# Patient Record
Sex: FEMALE | Race: WHITE | NOT HISPANIC OR LATINO | Employment: OTHER | URBAN - METROPOLITAN AREA
[De-identification: names, ages, dates, MRNs, and addresses within clinical notes are randomized per-mention and may not be internally consistent; named-entity substitution may affect disease eponyms.]

---

## 2017-01-05 ENCOUNTER — ANESTHESIA EVENT (OUTPATIENT)
Dept: PERIOP | Facility: HOSPITAL | Age: 82
End: 2017-01-05

## 2017-01-05 ENCOUNTER — ANESTHESIA (OUTPATIENT)
Dept: PERIOP | Facility: HOSPITAL | Age: 82
End: 2017-01-05

## 2017-01-05 PROCEDURE — 25010000002 DEXAMETHASONE PER 1 MG: Performed by: NURSE ANESTHETIST, CERTIFIED REGISTERED

## 2017-01-05 PROCEDURE — 25010000003 CEFAZOLIN IN DEXTROSE 2-4 GM/100ML-% SOLUTION: Performed by: ORTHOPAEDIC SURGERY

## 2017-01-05 PROCEDURE — 25010000002 PROPOFOL 10 MG/ML EMULSION: Performed by: NURSE ANESTHETIST, CERTIFIED REGISTERED

## 2017-01-05 PROCEDURE — 25010000002 ROPIVACAINE PER 1 MG: Performed by: ANESTHESIOLOGY

## 2017-01-05 PROCEDURE — 25010000002 ONDANSETRON PER 1 MG: Performed by: NURSE ANESTHETIST, CERTIFIED REGISTERED

## 2017-01-05 PROCEDURE — 25010000002 FENTANYL CITRATE (PF) 100 MCG/2ML SOLUTION: Performed by: ANESTHESIOLOGY

## 2017-01-05 PROCEDURE — 25010000002 NEOSTIGMINE PER 0.5 MG: Performed by: NURSE ANESTHETIST, CERTIFIED REGISTERED

## 2017-01-05 RX ORDER — ATRACURIUM BESYLATE 10 MG/ML
INJECTION, SOLUTION INTRAVENOUS AS NEEDED
Status: DISCONTINUED | OUTPATIENT
Start: 2017-01-05 | End: 2017-01-05 | Stop reason: SURG

## 2017-01-05 RX ORDER — PROPOFOL 10 MG/ML
VIAL (ML) INTRAVENOUS AS NEEDED
Status: DISCONTINUED | OUTPATIENT
Start: 2017-01-05 | End: 2017-01-05 | Stop reason: SURG

## 2017-01-05 RX ORDER — ONDANSETRON 2 MG/ML
INJECTION INTRAMUSCULAR; INTRAVENOUS AS NEEDED
Status: DISCONTINUED | OUTPATIENT
Start: 2017-01-05 | End: 2017-01-05 | Stop reason: SURG

## 2017-01-05 RX ORDER — BUPIVACAINE HYDROCHLORIDE 2.5 MG/ML
INJECTION, SOLUTION EPIDURAL; INFILTRATION; INTRACAUDAL AS NEEDED
Status: DISCONTINUED | OUTPATIENT
Start: 2017-01-05 | End: 2017-01-05 | Stop reason: SURG

## 2017-01-05 RX ORDER — PROPOFOL 10 MG/ML
VIAL (ML) INTRAVENOUS CONTINUOUS PRN
Status: DISCONTINUED | OUTPATIENT
Start: 2017-01-05 | End: 2017-01-05 | Stop reason: SURG

## 2017-01-05 RX ORDER — LABETALOL HYDROCHLORIDE 5 MG/ML
INJECTION, SOLUTION INTRAVENOUS AS NEEDED
Status: DISCONTINUED | OUTPATIENT
Start: 2017-01-05 | End: 2017-01-05 | Stop reason: SURG

## 2017-01-05 RX ORDER — LIDOCAINE HYDROCHLORIDE 10 MG/ML
INJECTION, SOLUTION INFILTRATION; PERINEURAL AS NEEDED
Status: DISCONTINUED | OUTPATIENT
Start: 2017-01-05 | End: 2017-01-05 | Stop reason: SURG

## 2017-01-05 RX ORDER — DEXAMETHASONE SODIUM PHOSPHATE 4 MG/ML
INJECTION, SOLUTION INTRA-ARTICULAR; INTRALESIONAL; INTRAMUSCULAR; INTRAVENOUS; SOFT TISSUE AS NEEDED
Status: DISCONTINUED | OUTPATIENT
Start: 2017-01-05 | End: 2017-01-05 | Stop reason: SURG

## 2017-01-05 RX ORDER — GLYCOPYRROLATE 0.2 MG/ML
INJECTION INTRAMUSCULAR; INTRAVENOUS AS NEEDED
Status: DISCONTINUED | OUTPATIENT
Start: 2017-01-05 | End: 2017-01-05 | Stop reason: SURG

## 2017-01-05 RX ORDER — FENTANYL CITRATE 50 UG/ML
INJECTION, SOLUTION INTRAMUSCULAR; INTRAVENOUS AS NEEDED
Status: DISCONTINUED | OUTPATIENT
Start: 2017-01-05 | End: 2017-01-05 | Stop reason: SURG

## 2017-01-05 RX ADMIN — BUPIVACAINE HYDROCHLORIDE 5 ML: 2.5 INJECTION, SOLUTION EPIDURAL; INFILTRATION; INTRACAUDAL; PERINEURAL at 15:09

## 2017-01-05 RX ADMIN — PROPOFOL 25 MCG/KG/MIN: 10 INJECTION, EMULSION INTRAVENOUS at 13:25

## 2017-01-05 RX ADMIN — ATRACURIUM BESYLATE 10 MG: 10 INJECTION, SOLUTION INTRAVENOUS at 14:29

## 2017-01-05 RX ADMIN — ROBINUL 0.4 MG: 0.2 INJECTION INTRAMUSCULAR; INTRAVENOUS at 16:22

## 2017-01-05 RX ADMIN — CEFAZOLIN SODIUM 2 G: 2 INJECTION, SOLUTION INTRAVENOUS at 13:17

## 2017-01-05 RX ADMIN — Medication 3 MG: at 16:22

## 2017-01-05 RX ADMIN — ONDANSETRON 4 MG: 2 INJECTION INTRAMUSCULAR; INTRAVENOUS at 16:11

## 2017-01-05 RX ADMIN — ATRACURIUM BESYLATE 30 MG: 10 INJECTION, SOLUTION INTRAVENOUS at 13:20

## 2017-01-05 RX ADMIN — EPHEDRINE SULFATE 10 MG: 50 INJECTION INTRAMUSCULAR; INTRAVENOUS; SUBCUTANEOUS at 14:05

## 2017-01-05 RX ADMIN — BUPIVACAINE HYDROCHLORIDE 5 ML: 2.5 INJECTION, SOLUTION EPIDURAL; INFILTRATION; INTRACAUDAL; PERINEURAL at 13:35

## 2017-01-05 RX ADMIN — LABETALOL HYDROCHLORIDE 5 MG: 5 INJECTION, SOLUTION INTRAVENOUS at 15:06

## 2017-01-05 RX ADMIN — BUPIVACAINE HYDROCHLORIDE 20 ML: 2.5 INJECTION, SOLUTION EPIDURAL; INFILTRATION; INTRACAUDAL; PERINEURAL at 12:30

## 2017-01-05 RX ADMIN — Medication 6 ML/HR: at 16:22

## 2017-01-05 RX ADMIN — LIDOCAINE HYDROCHLORIDE 50 MG: 10 INJECTION, SOLUTION INFILTRATION; PERINEURAL at 13:20

## 2017-01-05 RX ADMIN — PROPOFOL 100 MG: 10 INJECTION, EMULSION INTRAVENOUS at 13:20

## 2017-01-05 RX ADMIN — DEXAMETHASONE SODIUM PHOSPHATE 8 MG: 4 INJECTION, SOLUTION INTRAMUSCULAR; INTRAVENOUS at 13:20

## 2017-01-05 RX ADMIN — FENTANYL CITRATE 100 MCG: 50 INJECTION, SOLUTION INTRAMUSCULAR; INTRAVENOUS at 12:30

## 2017-01-05 NOTE — ANESTHESIA PREPROCEDURE EVALUATION
Anesthesia Evaluation     Patient summary reviewed and Nursing notes reviewed    Airway   Mallampati: II  TM distance: >3 FB  Neck ROM: limited  possible difficult intubation  Dental          Pulmonary - negative pulmonary ROS   Cardiovascular   Exercise tolerance: good (4-7 METS)  (+) hypertension well controlled,     Neuro/Psych- negative ROS  GI/Hepatic/Renal/Endo      Musculoskeletal     Abdominal    Substance History      OB/GYN          Other                             Anesthesia Plan    ASA 2     general     intravenous induction     ISB for post op pain

## 2017-01-05 NOTE — ANESTHESIA PROCEDURE NOTES
Peripheral Block    Patient location during procedure: pre-op  Stop time: 1/5/2017 12:41 PM  Reason for block: at surgeon's request and post-op pain management  Performed by  Anesthesiologist: IRA MARIN  Preanesthetic Checklist  Completed: patient identified, site marked, surgical consent, pre-op evaluation, timeout performed, IV checked, risks and benefits discussed and monitors and equipment checked  Peripheral Block Prep:  Sterile barriers:cap, gloves, mask and sterile barriers  Prep: ChloraPrep  Patient monitoring: blood pressure monitoring, continuous pulse oximetry and EKG  Peripheral Procedure  Sedation:yes  Guidance:ultrasound guided  Images:still images obtained  Laterality:leftBlock Type:interscalene  Injection Technique:catheterNeedle Type:Tuohy  Needle Gauge:18 G  Catheter Size:20 G (20g)ULTRASOUND INTERPRETATION. Using ultrasound guidance a gauge needle was placed in close proximity to the brachial plexus nerve, at which point, under ultrasound guidance anesthetic was injected in the area of the nerve and spread of the anesthesia was seen on ultrasound in close proximity thereto.  There were no abnormalities seen on ultrasound; a digital image was taken; and the patient tolerated the procedure with no complications.   Medications  Local Injected:bupivacaine 0.25% without epinephrine Local Amount Injected:20mL  Post Assessment  Patient Tolerance:comfortable throughout block  Complications:no  Additional Notes  Procedure:                Catheter at skin 8 cms                                      Anesthesia was provide by lido !%      The pt was placed in semifowlers position with a slight tilt of the thorax contralateral to the insertion site.  The Insertion Site was prepped and draped in sterile fashion.  The skin was anesthetized with Lidocaine 1% 1ml injection utilizing a 25g needle.  Utilizing ultrasound guidance, a BBraun 2 inch 18 g Contiplex echogenic touhy needle was advanced in-plane.   Hydro dissection of tissue was achieved with Normal saline. Major vessels(carotid and Internal Jugular) where visualized as the brachial plexus was approached at the approximate level of C-7/ T-1.  Cervical 5 and Branches of Cervical 6 nerve roots where visualized and the needle tip was placed posterior at the level of C-6 roots.  LA spread was visualized and injection was made incrementally every 5 mls with aspiration. Injection pressure was normal or little, there was no intraneural injection, no vascular injection.      The BBraun 20 g wire stylet  catheter was then placed under US guidance on the posterior aspect of the Brachial Plexus.  Location of catheter was confirmed with NS injection visualized with US . The tuohy was then removed and the skin was sealed with Skin AFix at catheter insertion site.  Skin was prepped with mastisol and the labeled catheter  was secured with steristrips and a CHG tegaderm. Thank You.

## 2017-01-05 NOTE — ANESTHESIA PROCEDURE NOTES
Airway  Urgency: elective    Airway not difficult    General Information and Staff    Patient location during procedure: OR  CRNA: JAVIER LOWERY    Indications and Patient Condition  Indications for airway management: airway protection    Preoxygenated: yes  MILS not maintained throughout  Mask difficulty assessment: 1 - vent by mask    Final Airway Details  Final airway type: endotracheal airway      Successful airway: ETT  Cuffed: yes   Successful intubation technique: direct laryngoscopy  Endotracheal tube insertion site: oral  Blade: Deejay  Blade size: #3  ETT size: 6.5 mm  Cormack-Lehane Classification: grade I - full view of glottis  Placement verified by: chest auscultation and capnometry   Cuff volume (mL): 6  Measured from: lips  ETT to lips (cm): 20  Number of attempts at approach: 1    Additional Comments  Negative epigastric sounds, Breath sound equal bilaterally with symmetric chest rise and fall. Atraumatic, no damage to lips or teeth during intubation

## 2017-05-30 ENCOUNTER — OFFICE VISIT (OUTPATIENT)
Dept: ORTHOPEDIC SURGERY | Facility: CLINIC | Age: 82
End: 2017-05-30

## 2017-05-30 VITALS
DIASTOLIC BLOOD PRESSURE: 98 MMHG | HEIGHT: 66 IN | HEART RATE: 71 BPM | SYSTOLIC BLOOD PRESSURE: 184 MMHG | BODY MASS INDEX: 26.2 KG/M2 | WEIGHT: 163 LBS

## 2017-05-30 DIAGNOSIS — M70.62 TROCHANTERIC BURSITIS OF LEFT HIP: Primary | ICD-10-CM

## 2017-05-30 DIAGNOSIS — M25.552 PAIN OF LEFT HIP JOINT: ICD-10-CM

## 2017-05-30 DIAGNOSIS — M47.26 OSTEOARTHRITIS OF SPINE WITH RADICULOPATHY, LUMBAR REGION: ICD-10-CM

## 2017-05-30 PROCEDURE — 20610 DRAIN/INJ JOINT/BURSA W/O US: CPT | Performed by: ORTHOPAEDIC SURGERY

## 2017-05-30 PROCEDURE — 99204 OFFICE O/P NEW MOD 45 MIN: CPT | Performed by: ORTHOPAEDIC SURGERY

## 2017-05-30 RX ORDER — BUPIVACAINE HYDROCHLORIDE 2.5 MG/ML
3 INJECTION, SOLUTION INFILTRATION; PERINEURAL
Status: COMPLETED | OUTPATIENT
Start: 2017-05-30 | End: 2017-05-30

## 2017-05-30 RX ORDER — LIDOCAINE HYDROCHLORIDE 10 MG/ML
3 INJECTION, SOLUTION INFILTRATION; PERINEURAL
Status: COMPLETED | OUTPATIENT
Start: 2017-05-30 | End: 2017-05-30

## 2017-05-30 RX ORDER — TRIAMCINOLONE ACETONIDE 40 MG/ML
80 INJECTION, SUSPENSION INTRA-ARTICULAR; INTRAMUSCULAR
Status: COMPLETED | OUTPATIENT
Start: 2017-05-30 | End: 2017-05-30

## 2017-05-30 RX ADMIN — BUPIVACAINE HYDROCHLORIDE 3 ML: 2.5 INJECTION, SOLUTION INFILTRATION; PERINEURAL at 09:29

## 2017-05-30 RX ADMIN — LIDOCAINE HYDROCHLORIDE 3 ML: 10 INJECTION, SOLUTION INFILTRATION; PERINEURAL at 09:29

## 2017-05-30 RX ADMIN — TRIAMCINOLONE ACETONIDE 80 MG: 40 INJECTION, SUSPENSION INTRA-ARTICULAR; INTRAMUSCULAR at 09:29

## 2017-08-01 ENCOUNTER — OFFICE VISIT (OUTPATIENT)
Dept: ORTHOPEDIC SURGERY | Facility: CLINIC | Age: 82
End: 2017-08-01

## 2017-08-01 VITALS
HEIGHT: 66 IN | BODY MASS INDEX: 24.75 KG/M2 | HEART RATE: 72 BPM | WEIGHT: 154 LBS | DIASTOLIC BLOOD PRESSURE: 90 MMHG | SYSTOLIC BLOOD PRESSURE: 175 MMHG

## 2017-08-01 DIAGNOSIS — M47.26 OSTEOARTHRITIS OF SPINE WITH RADICULOPATHY, LUMBAR REGION: Primary | ICD-10-CM

## 2017-08-01 DIAGNOSIS — M70.62 TROCHANTERIC BURSITIS OF LEFT HIP: ICD-10-CM

## 2017-08-01 PROCEDURE — 99213 OFFICE O/P EST LOW 20 MIN: CPT | Performed by: ORTHOPAEDIC SURGERY

## 2017-08-01 NOTE — PROGRESS NOTES
Orthopaedic Clinic Note: Hip Established Patient    Chief Complaint   Patient presents with   • Left Hip - Follow-up     2 months        HPI    It has been 2  month(s) since Ms. Mclain's last visit. She returns to clinic today for Follow-up of left trochanteric bursitis and radicular left lower extremity pain. She rates her pain a 8/10 on the pain scale and is currently taking Motrin/Ibuprofen and Tylenol for pain. She is ambulating with no assistive device. She has completed therapy with minimal pain relief.  Overall, she feels she is doing worse.  She did state that the corticosteroid injection into the left hip trochanteric bursa did provide significant relief for approximately 6 weeks.     Past Medical History:   Diagnosis Date   • Atrial contractions, premature    • Benign hypertension    • Chest pain    • Esophagitis    • Heart disease    • Hypercholesterolemia    • Mitral regurgitation     Mild mitral regurgitation by echocardiography.   • Osteoarthritis    • Palpitations     Palpitations.  Reviewed the results of her Zio monitor with her.  Again, they are premature atrial contractions with no arrhythmias or pauses.  Will continue with flecainide, provided refills, and see her back in 8 months.    • Pneumonia    • UTI (urinary tract infection)       Past Surgical History:   Procedure Laterality Date   • APPENDECTOMY     • BACK SURGERY      Low   • BLADDER SURGERY     • BREAST SURGERY      mammoplasty-reduction   • BUNIONECTOMY Bilateral    • CARDIAC SURGERY      cardiac stent x 1 (2001)   • CARPAL TUNNEL RELEASE Bilateral    • EYE SURGERY      retina patch bilat (1980)   • HYSTERECTOMY     • KNEE ARTHROPLASTY Right    • REPLACEMENT TOTAL KNEE Right 2015   • SHOULDER ARTHROSCOPY W/ ROTATOR CUFF REPAIR Left 1/5/2017    Procedure: SHOULDER ARTHROSCOPY WITH ROTATOR CUFF REPAIR LEFT;  Surgeon: Parrish Bright MD;  Location: [facility];  Service:       Social History     Social History   • Marital status:       Spouse name: N/A   • Number of children: N/A   • Years of education: N/A     Occupational History   • Not on file.     Social History Main Topics   • Smoking status: Never Smoker   • Smokeless tobacco: Never Used   • Alcohol use Yes      Comment: occasionally   • Drug use: No   • Sexual activity: Defer     Other Topics Concern   • Not on file     Social History Narrative      Current Outpatient Prescriptions on File Prior to Visit   Medication Sig Dispense Refill   • aspirin 81 MG EC tablet Take 81 mg by mouth Daily.     • Famotidine (PEPCID PO) Take 25 mg by mouth Daily.     • flecainide (TAMBOCOR) 50 MG tablet Take 1 tablet by mouth 2 (Two) Times a Day. 180 tablet 3   • folic acid (FOLVITE) 400 MCG tablet Take 400 mcg by mouth Daily.     • hydrochlorothiazide (HYDRODIURIL) 25 MG tablet Take 1 tablet by mouth Daily. 90 tablet 3   • HYDROcodone-acetaminophen (NORCO) 7.5-325 MG per tablet Take 1-2 tablets by mouth Every 4 (Four) Hours As Needed for moderate pain (4-6) (Pain). 51 tablet 0   • niacin 500 MG tablet Take 1 tablet by mouth Every Night. 90 tablet 3   • promethazine (PHENERGAN) 12.5 MG tablet Take 1 tablet by mouth Every 8 (Eight) Hours As Needed for nausea or vomiting. 40 tablet 0     No current facility-administered medications on file prior to visit.       Allergies   Allergen Reactions   • Albuterol      Unknown reactions   • Statins Myalgia   • Demerol [Meperidine] Itching   • Sulfa Antibiotics Itching        Review of Systems   Constitutional: Positive for activity change and fatigue.   HENT: Positive for hearing loss.    Eyes: Negative.    Respiratory: Negative.    Cardiovascular: Negative.    Gastrointestinal: Negative.    Endocrine: Negative.    Genitourinary: Positive for frequency.   Musculoskeletal: Positive for arthralgias, gait problem (Left hip pain) and neck pain.   Skin: Negative.    Allergic/Immunologic: Negative.    Hematological: Bruises/bleeds easily.   Psychiatric/Behavioral:  "Negative.         Physical Exam  Blood pressure 175/90, pulse 72, height 66\" (167.6 cm), weight 154 lb (69.9 kg).    Body mass index is 24.86 kg/(m^2).    GENERAL APPEARANCE: awake, alert, oriented, in no acute distress  LUNGS:  breathing nonlabored  EXTREMITIES: no clubbing, cyanosis  PERIPHERAL PULSES: palpable dorsalis pedis and posterior tibial pulses bilaterally.    GAIT:  Normal            Hip Exam:  Left    RANGE OF MOTION:  EXTENSION/FLEXION:  normal (0-110 degrees)  IR (at 90 degrees of flexion):  20  ER (at 90 degrees of flexion):  30  PAIN WITH HIP MOTION:  yes, Laterally over trochanteric bursa  PAIN WITH LOGROLL:  no     STINCHFIELD TEST: negative    STRENGTH:  ABDUCTOR:  4/5  ADDUCTOR:  5/5  HIP FLEXION:  5/5    GREATER TROCHANTER BURSAL PAIN:  yes    SENSATION TO LIGHT TOUCH:  DEEP PERONEAL/SUPERFICIAL PERONEAL/SURAL/SAPHENOUS/TIBIAL:   intact    EDEMA:  no  ERYTHEMA:  no  WOUNDS/INCISIONS:  no  _________________________________________________________________  _________________________________________________________________    RADIOGRAPHIC FINDINGS:   No new radiographs today    Assessment/Plan:   Diagnosis Plan   1. Osteoarthritis of spine with radiculopathy, lumbar region  Ambulatory Referral to Physical Therapy Evaluate and treat    Ambulatory Referral to Pain Management   2. Trochanteric bursitis of left hip       I explained to the patient that her ongoing radicular type pain is concerning for nerve compression the lumbar spine.  She has failed a course of physical therapy but wishes to change therapists as she did not like her therapist this past time.  I will therefore refer her to [facility] for repeat evaluation.  In addition to this I recommended referral to pain management for potential epidural injections for her lumbar radiculopathy.  I will see her back in 2 months for repeat assessment.    Huan Hansen MD  08/01/17  10:59 AM  "

## 2017-08-04 ENCOUNTER — HOSPITAL ENCOUNTER (OUTPATIENT)
Dept: PHYSICAL THERAPY | Facility: HOSPITAL | Age: 82
Setting detail: THERAPIES SERIES
Discharge: HOME | End: 2017-08-04
Attending: ORTHOPAEDIC SURGERY

## 2017-08-04 DIAGNOSIS — M54.40 CHRONIC BILATERAL LOW BACK PAIN WITH SCIATICA, SCIATICA LATERALITY UNSPECIFIED: Primary | ICD-10-CM

## 2017-08-04 DIAGNOSIS — G89.29 CHRONIC BILATERAL LOW BACK PAIN WITH SCIATICA, SCIATICA LATERALITY UNSPECIFIED: Primary | ICD-10-CM

## 2017-08-04 PROCEDURE — G8985 CARRY GOAL STATUS: HCPCS | Performed by: PHYSICAL THERAPIST

## 2017-08-04 PROCEDURE — 97162 PT EVAL MOD COMPLEX 30 MIN: CPT | Performed by: PHYSICAL THERAPIST

## 2017-08-04 PROCEDURE — G8984 CARRY CURRENT STATUS: HCPCS | Performed by: PHYSICAL THERAPIST

## 2017-08-04 PROCEDURE — 97110 THERAPEUTIC EXERCISES: CPT | Performed by: PHYSICAL THERAPIST

## 2017-08-04 NOTE — THERAPY EVALUATION
"    Outpatient Physical Therapy Ortho Initial Evaluation  [facility]     Patient Name: Yuni Mclain  : 1935  MRN: [id number]  Today's Date: 2017      Visit Date: 2017    Patient Active Problem List   Diagnosis   • Atrial contractions, premature   • Hypercholesterolemia   • Benign hypertension   • Chest pain   • Mitral regurgitation   • Palpitations        Past Medical History:   Diagnosis Date   • Atrial contractions, premature    • Benign hypertension    • Chest pain    • Esophagitis    • Heart disease    • Hypercholesterolemia    • Mitral regurgitation     Mild mitral regurgitation by echocardiography.   • Osteoarthritis    • Palpitations     Palpitations.  Reviewed the results of her Zio monitor with her.  Again, they are premature atrial contractions with no arrhythmias or pauses.  Will continue with flecainide, provided refills, and see her back in 8 months.    • Pneumonia    • UTI (urinary tract infection)         Past Surgical History:   Procedure Laterality Date   • APPENDECTOMY     • BACK SURGERY      Low   • BLADDER SURGERY     • BREAST SURGERY      mammoplasty-reduction   • BUNIONECTOMY Bilateral    • CARDIAC SURGERY      cardiac stent x 1 ()   • CARPAL TUNNEL RELEASE Bilateral    • EYE SURGERY      retina patch bilat ()   • HYSTERECTOMY     • KNEE ARTHROPLASTY Right    • REPLACEMENT TOTAL KNEE Right    • SHOULDER ARTHROSCOPY W/ ROTATOR CUFF REPAIR Left 2017    Procedure: SHOULDER ARTHROSCOPY WITH ROTATOR CUFF REPAIR LEFT;  Surgeon: Parrish Bright MD;  Location: [facility];  Service:        Visit Dx:     ICD-10-CM ICD-9-CM   1. Chronic bilateral low back pain with sciatica, sciatica laterality unspecified M54.40 724.2    G89.29 724.3     338.29             Patient History       17 1400          History    Chief Complaint Pain;Numbness  -APRIL      Type of Pain Back pain;Lower Extremity / Leg;Hip pain  -APRIL      Date Current Problem(s) Began --   \"the last year\"  " -APRIL      Brief Description of Current Complaint Patient has a history of lower back pain with L3/4 laminectomy back in the mid to early eighties.  She states that she has significant arthritis in her lumbar spine and her pain increased over the last year and is worse upon waking.  When she rolls over the left sidelying at night she has more pain increased in the leg and back.  The pain radiated from the sacral region into the lateral left leg and into the lower leg.  She is very motivated to do physical therapy for relief.  -APRIL      Smoking Status nonsmoker  -APRIL      Hand Dominance right-handed  -APRIL      Occupation/sports/leisure activities retired nurse anesthetist since 2013.  Hobbies: reading, gardening,   -APRIL      Patient seeing anyone else for problem(s)? No  -APRIL      How has patient tried to help current problem? previous PT finished about 3 weeks ago.  depomedrol injection 2 mos ago with 3 weeks releif.  -APRIL      What clinical tests have you had for this problem? X-ray  -APRIL      Results of Clinical Tests DDD  -APRIL      Pain     Pain Location Back;Hip;Leg  -APRIL      Pain at Present 4;5  -APRIL      Pain at Best 4;5  -ARPIL      Pain at Worst 9  -APRIL      Pain Frequency Constant/continuous;Intermittent  -APRIL      Pain Description Sharp;Pounding;Radiating  -APRIL      What Performance Factors Make the Current Problem(s) WORSE? waking up in the morning, rolling into sidelying  -APRIL      What Performance Factors Make the Current Problem(s) BETTER? sitting left leg in figure four position  -APRIL      Difficulties at work? na  -APRIL      Difficulties with ADL's? putting on bras and shirts.    -APRIL      Difficulties with recreational activities? gardening, stairs  -APRIL      Fall Risk Assessment    Any falls in the past year: No  -APRIL      Services    Prior Rehab/Home Health Experiences Yes  -APRIL      Where was the prior experience with Rehab/Home Health [facility]  -APRIL      Are you currently receiving Home Health services No  -APRIL      Daily  Activities    Primary Language English  -APRIL      Pt Participated in POC and Goals Yes  -APRIL      Safety    Are you being hurt, hit, or frightened by anyone at home or in your life? No  -APRIL        User Key  (r) = Recorded By, (t) = Taken By, (c) = Cosigned By    Initials Name Provider Type    APRIL Harvey, PT Physical Therapist                PT Ortho       08/04/17 1600    Posture/Observations    Posture/Observations Comments hips shifted left with right sidebend.  -APRIL    Quarter Clearing    Quarter Clearing Lower Quarter Clearing  -APRIL    DTR- Lower Quarter Clearing    Patellar tendon (L2-4) Bilateral:;2- Normal response  -APRIL    Achilles tendon (S1-2) Bilateral:;2- Normal response  -APRIL    Neural Tension Signs- Lower Quarter Clearing    Slump Negative  -APRIL    SLR Negative  -APRIL    Pathological Reflexes- Lower Quarter Clearing    Clonus Negative  -APRIL    Iglesias Negative  -APRIL    Sensory Screen for Light Touch- Lower Quarter Clearing    L1 (inguinal area) Intact  -APRIL    L2 (anterior mid thigh) Intact  -APRIL    L3 (distal anterior thigh) Intact  -APRIL    L4 (medial lower leg/foot) Intact  -APRIL    L5 (lateral lower leg/great toe) Intact  -APRIL    S1 (bottom of foot) Intact  -APRIL    Myotomal Screen- Lower Quarter Clearing    Hip flexion (L2) Left:;4+ (Good +);Right:;5 (Normal)  -APRIL    Knee extension (L3) Bilateral:;5 (Normal)  -APRIL    Ankle DF (L4) Bilateral:;5 (Normal)  -APRIL    Great toe extension (L5) Bilateral:;WNL  -APRIL    Ankle PF (S1) Bilateral:;3 (Fair)  -APRIL    Knee flexion (S2) Bilateral:;5 (Normal)  -APRIL    Lumbar ROM Screen- Lower Quarter Clearing    Lumbar Flexion Impaired   40 degrees with significant pain response  -APRIL    Lumbar Extension Impaired   10 degrees with pain  -APRIL    Lumbar Lateral Flexion Unable to perform  -APRIL    SI/Hip Screen- Lower Quarter Clearing    Cecilia's/John's test Bilateral:;Positive  -APRIL    Posterior thigh sheer Bilateral:;Negative  -APRIL    Special Tests/Palpation    Special Tests/Palpation  Hip;Lumbar/SI  -APRIL    Lumbosacral Palpation    Lumbosacral Segment Tender;Bilateral:;Guarded/taut  -APRIL    Ischial Tuberosity --   nontender  -APRIL    Lumbosacral Palpation? Yes  -APRIL    Lumbar/SI Special Tests    SI Compression Test (SI Dysfunction) Left:;Positive  -APRIL    Sacral Spring Test (SI Dysfunction) Left:;Positive  -APRIL    Hip/Thigh Palpation    Greater Trochanter Left:   not tender  -APRIL    Piriformis Left:;Guarded/taut;Tender  -APRIL    SI Left:;Tender  -APRIL    Hip/Thigh Palpation? Yes  -APRIL    Hip Special Tests    TRAN (hip vs SI pathology) Bilateral:;Positive   left painful bilateral restricted  -APRIL    Piriformis test (piriformis syndrome) Left:;Positive  -APRIL    ROM (Range of Motion)    General ROM Detail see quarter clearing  -APRIL    MMT (Manual Muscle Testing)    General MMT Assessment Detail see quarter clearing. Glute max not tested.  Glute med not tested  -APRIL    Flexibility    Flexibility Tested? Lower Extremity  -APRIL    Lower Extremity Flexibility    Hamstrings Bilateral:;Mildly limited  -APRIL    Hip Flexors Bilateral:;Moderately limited  -APRIL    Hip External Rotators Right:;Mildly limited  -APRIL    Hip Internal Rotators Left:;Severely limited;Right:;Moderately limited  -APRIL    Transfers    Transfer, Comment stiff and labored transfer supine to sit with pain response.  Pain with standing after sitting.  All independent  -APRIL    Gait Assessment/Treatment    Gait, Comment decreased left arm swing with gait, step to gait pattern no AD.  -APRIL    Stairs Assessment/Treatment    Stairs, Comment not assessed  -APRIL      User Key  (r) = Recorded By, (t) = Taken By, (c) = Cosigned By    Initials Name Provider Type    APRIL Harvey, PT Physical Therapist                                PT OP Goals       08/04/17 1600       PT Short Term Goals    STG Date to Achieve 08/18/17  -APRIL     STG 1 Patient to tolerate entire session with no pain greater than 6/10  -APRIL     STG 1 Progress New  -APRIL     STG 2 Patient to be compliant with  initial HEP  -APRIL     STG 2 Progress New  -APRIL     Long Term Goals    LTG Date to Achieve 09/01/17  -APRIL     LTG 1 Patient to be independent with final HEP  -APRIL     LTG 1 Progress New  -APRIL     LTG 2 Patient to report pain 0/10 intermittently  -APRIL     LTG 2 Progress New  -APRIL     LTG 3 Patient to stand after at least 10 minutes supine activities with no significant increase in pain  -APRIL     LTG 3 Progress New  -APRIL     LTG 4 Patient to demonstrate lumbar flexion at least 70  -APRIL     LTG 4 Progress New  -APRIL     LTG 5 Patient to improve Mod Oswestry to at least 38/50  -APRIL     LTG 5 Progress New  -APRIL     Time Calculation    PT Goal Re-Cert Due Date 09/03/17  -APRIL       User Key  (r) = Recorded By, (t) = Taken By, (c) = Cosigned By    Initials Name Provider Type    APRIL Harvey, PT Physical Therapist                PT Assessment/Plan       08/04/17 1500       PT Assessment    Functional Limitations Impaired gait;Performance in self-care ADL;Performance in leisure activities  -APRIL     Impairments Range of motion;Posture;Pain;Locomotion;Gait  -APRIL     Assessment Comments Patient presents with one year plus history of back pain with left hip and LE pain specifically upon waking and after prolonged positioning.  The pain is severe and seems to be evolving and while she reports that she is able to work through the pain while working at home she has pain with standing ADL's, bending and lifting tasks.  -APRIL     Please refer to paper survey for additional self-reported information Yes  -APRIL     Rehab Potential Good  -APRIL     Patient/caregiver participated in establishment of treatment plan and goals Yes  -APRIL     Patient would benefit from skilled therapy intervention Yes  -APRIL     PT Plan    PT Frequency 2x/week;1x/week  -APRIL     Predicted Duration of Therapy Intervention (days/wks) 4-6 weeks  -APRIL     Planned CPT's? PT EVAL MOD COMPLELITY: 19030;PT THER PROC EA 15 MIN: 63189;PT MANUAL THERAPY EA 15 MIN: 78057;PT NEUROMUSC RE-EDUCATION  EA 15 MIN: 57432;PT ELECTRICAL STIM UNATTEND: ;PT ULTRASOUND EA 15 MIN: 23749;PT TRACTION LUMBAR: 98304;PT HOT OR COLD PACK TREAT MCARE  -APRIL     PT Plan Comments stretches and strengthening of the hip and core with focus initially on piriformis and left hip. will progress as tolerated to core stabilization program.  Manual techniques for the left hip and modalities prn for pain.  -APRIL       User Key  (r) = Recorded By, (t) = Taken By, (c) = Cosigned By    Initials Name Provider Type    APRIL Harvey, MEGHANA Physical Therapist                  Exercises       08/04/17 1600          Exercise 2    Exercise Name 2 seated piriformis  -APRIL      Sets 2 2  -APRIL      Time (Seconds) 2 30  -APRIL      Exercise 3    Exercise Name 3 LTR  -APRIL      Reps 3 10  -APRIL      Exercise 4    Exercise Name 4 supine pirifromis stretch  -APRIL      Sets 4 2  -APRIL      Time (Seconds) 4 30  -APRIL        User Key  (r) = Recorded By, (t) = Taken By, (c) = Cosigned By    Initials Name Provider Type    APRIL Harvey PT Physical Therapist                              Time Calculation:   Start Time: 1430  Total Timed Code Minutes- PT: 12 minute(s)     Therapy Charges for Today     Code Description Service Date Service Provider Modifiers Qty    20920110039 HC PT CARRY MOV HAND OBJ CURRENT 8/4/2017 Ezio Harvey, PT GP, CL 1    04968565085 HC PT CARRY MOV HAND OBJ PROJECTED 8/4/2017 Ezio Harvey, PT GP, CJ 1    59791846616 HC PT THER PROC EA 15 MIN 8/4/2017 Ezio Harvey, PT GP 1    94273050115 HC PT EVAL MOD COMPLEXITY 3 8/4/2017 Ezio Harvey, PT GP 1          PT G-Codes  PT Professional Judgement Used?: Yes  Functional Limitation: Carrying, moving and handling objects  Carrying, Moving and Handling Objects Current Status (): At least 60 percent but less than 80 percent impaired, limited or restricted  Carrying, Moving and Handling Objects Goal Status (): At least 20 percent but less than 40 percent impaired, limited or restricted          Ezio Harvey, PT  8/4/2017

## 2017-08-08 ENCOUNTER — HOSPITAL ENCOUNTER (OUTPATIENT)
Dept: PHYSICAL THERAPY | Facility: HOSPITAL | Age: 82
Setting detail: THERAPIES SERIES
Discharge: HOME | End: 2017-08-08

## 2017-08-08 DIAGNOSIS — M54.40 CHRONIC BILATERAL LOW BACK PAIN WITH SCIATICA, SCIATICA LATERALITY UNSPECIFIED: Primary | ICD-10-CM

## 2017-08-08 DIAGNOSIS — G89.29 CHRONIC BILATERAL LOW BACK PAIN WITH SCIATICA, SCIATICA LATERALITY UNSPECIFIED: Primary | ICD-10-CM

## 2017-08-08 PROCEDURE — 97110 THERAPEUTIC EXERCISES: CPT | Performed by: PHYSICAL THERAPIST

## 2017-08-08 PROCEDURE — 97140 MANUAL THERAPY 1/> REGIONS: CPT | Performed by: PHYSICAL THERAPIST

## 2017-08-08 NOTE — THERAPY TREATMENT NOTE
Outpatient Physical Therapy Ortho Treatment Note   Soo     Patient Name: Yuni Mclain  : 1935  MRN: [id number]  Today's Date: 2017      Visit Date: 2017    Visit Dx:    ICD-10-CM ICD-9-CM   1. Chronic bilateral low back pain with sciatica, sciatica laterality unspecified M54.40 724.2    G89.29 724.3     338.29       Patient Active Problem List   Diagnosis   • Atrial contractions, premature   • Hypercholesterolemia   • Benign hypertension   • Chest pain   • Mitral regurgitation   • Palpitations        Past Medical History:   Diagnosis Date   • Atrial contractions, premature    • Benign hypertension    • Chest pain    • Esophagitis    • Heart disease    • Hypercholesterolemia    • Mitral regurgitation     Mild mitral regurgitation by echocardiography.   • Osteoarthritis    • Palpitations     Palpitations.  Reviewed the results of her Zio monitor with her.  Again, they are premature atrial contractions with no arrhythmias or pauses.  Will continue with flecainide, provided refills, and see her back in 8 months.    • Pneumonia    • UTI (urinary tract infection)         Past Surgical History:   Procedure Laterality Date   • APPENDECTOMY     • BACK SURGERY      Low   • BLADDER SURGERY     • BREAST SURGERY      mammoplasty-reduction   • BUNIONECTOMY Bilateral    • CARDIAC SURGERY      cardiac stent x 1 ()   • CARPAL TUNNEL RELEASE Bilateral    • EYE SURGERY      retina patch bilat ()   • HYSTERECTOMY     • KNEE ARTHROPLASTY Right    • REPLACEMENT TOTAL KNEE Right    • SHOULDER ARTHROSCOPY W/ ROTATOR CUFF REPAIR Left 2017    Procedure: SHOULDER ARTHROSCOPY WITH ROTATOR CUFF REPAIR LEFT;  Surgeon: Parrish Bright MD;  Location: [facility];  Service:                                      Exercises       17 1000          Subjective Comments    Subjective Comments She states that on  she started to feel considerably better.  She reports that she has been compliant with  her HEP up to 4 times per day.    -APRIL      Subjective Pain    Able to rate subjective pain? yes  -APRIL      Pre-Treatment Pain Level 4  -APRIL      Subjective Pain Comment This is the first time I've seen four out of ten in months  -APRIL      Exercise 1    Exercise Name 1 NuStep L5  -APRIL      Time (Minutes) 1 5  -APRIL      Exercise 2    Exercise Name 2 seated piriformis stretch: pullup/push down  -APRIL      Sets 2 3  -APRIL      Time (Seconds) 2 30  -APRIL      Additional Comments each  -APRIL      Exercise 3    Exercise Name 3 LTR  -APRIL      Reps 3 10  -APRIL      Exercise 4    Exercise Name 4 supine piriformis stretch: knee to opposite shoulder  -APRIL      Sets 4 3  -APRIL      Time (Seconds) 4 30  -APRIL      Exercise 5    Exercise Name 5 wall lean stretch to the left  -APRIL      Sets 5 5  -APRIL      Time (Seconds) 5 10  -APRIL        User Key  (r) = Recorded By, (t) = Taken By, (c) = Cosigned By    Initials Name Provider Type    APRIL Harvey, PT Physical Therapist                        Manual Rx (last 36 hours)      Manual Treatments       08/08/17 0900          Manual Rx 1    Manual Rx 1 Location LLE  -APRIL      Manual Rx 1 Type LAD/lat distraction with gait belt/lat distraction with belt with hip flexion.  -APRIL      Manual Rx 1 Grade III/IV  -APRIL      Manual Rx 1 Duration 17 minutes  -APRIL        User Key  (r) = Recorded By, (t) = Taken By, (c) = Cosigned By    Initials Name Provider Type    APRIL Harvey, PT Physical Therapist                    Therapy Education       08/08/17 1040          Therapy Education    Education Details added all lean stretch to initial HEP  -APRIL      Given HEP  -APRIL      Program New  -APRIL      How Provided Verbal;Demonstration;Written  -APRIL      Provided to Patient  -APRIL      Level of Understanding Verbalized;Demonstrated  -APRIL        User Key  (r) = Recorded By, (t) = Taken By, (c) = Cosigned By    Initials Name Provider Type    APRIL Harvey, PT Physical Therapist                Time Calculation:   Start Time:  0956  Total Timed Code Minutes- PT: 42 minute(s)    Therapy Charges for Today     Code Description Service Date Service Provider Modifiers Qty    27703517445 HC PT THER PROC EA 15 MIN 8/8/2017 Ezio Harvey, PT GP 2    89866229402 HC PT MANUAL THERAPY EA 15 MIN 8/8/2017 Ezio Harvey, PT GP 1                    Ezio Harvey, PT  8/8/2017

## 2017-08-10 ENCOUNTER — HOSPITAL ENCOUNTER (OUTPATIENT)
Dept: PHYSICAL THERAPY | Facility: HOSPITAL | Age: 82
Setting detail: THERAPIES SERIES
Discharge: HOME | End: 2017-08-10

## 2017-08-10 DIAGNOSIS — G89.29 CHRONIC BILATERAL LOW BACK PAIN WITH SCIATICA, SCIATICA LATERALITY UNSPECIFIED: Primary | ICD-10-CM

## 2017-08-10 DIAGNOSIS — M54.40 CHRONIC BILATERAL LOW BACK PAIN WITH SCIATICA, SCIATICA LATERALITY UNSPECIFIED: Primary | ICD-10-CM

## 2017-08-10 PROCEDURE — 97140 MANUAL THERAPY 1/> REGIONS: CPT | Performed by: PHYSICAL THERAPIST

## 2017-08-10 PROCEDURE — 97110 THERAPEUTIC EXERCISES: CPT | Performed by: PHYSICAL THERAPIST

## 2017-08-10 NOTE — THERAPY TREATMENT NOTE
Outpatient Physical Therapy Ortho Treatment Note   Soo     Patient Name: Yuni Mclain  : 1935  MRN: [id number]  Today's Date: 8/10/2017      Visit Date: 08/10/2017    Visit Dx:    ICD-10-CM ICD-9-CM   1. Chronic bilateral low back pain with sciatica, sciatica laterality unspecified M54.40 724.2    G89.29 724.3     338.29       Patient Active Problem List   Diagnosis   • Atrial contractions, premature   • Hypercholesterolemia   • Benign hypertension   • Chest pain   • Mitral regurgitation   • Palpitations        Past Medical History:   Diagnosis Date   • Atrial contractions, premature    • Benign hypertension    • Chest pain    • Esophagitis    • Heart disease    • Hypercholesterolemia    • Mitral regurgitation     Mild mitral regurgitation by echocardiography.   • Osteoarthritis    • Palpitations     Palpitations.  Reviewed the results of her Zio monitor with her.  Again, they are premature atrial contractions with no arrhythmias or pauses.  Will continue with flecainide, provided refills, and see her back in 8 months.    • Pneumonia    • UTI (urinary tract infection)         Past Surgical History:   Procedure Laterality Date   • APPENDECTOMY     • BACK SURGERY      Low   • BLADDER SURGERY     • BREAST SURGERY      mammoplasty-reduction   • BUNIONECTOMY Bilateral    • CARDIAC SURGERY      cardiac stent x 1 ()   • CARPAL TUNNEL RELEASE Bilateral    • EYE SURGERY      retina patch bilat ()   • HYSTERECTOMY     • KNEE ARTHROPLASTY Right    • REPLACEMENT TOTAL KNEE Right    • SHOULDER ARTHROSCOPY W/ ROTATOR CUFF REPAIR Left 2017    Procedure: SHOULDER ARTHROSCOPY WITH ROTATOR CUFF REPAIR LEFT;  Surgeon: Parrish Bright MD;  Location: [facility];  Service:                              PT Assessment/Plan       08/10/17 1100       PT Assessment    Assessment Comments Patient presents with increased pain from mowing her yard.  She presents with peroneal distribution of symptoms that  improves with nerve glides.  Her hip pain remains elevated post treatment.  She made significant improvements from her first session to the second and this temporary increase in pain should be self limiting.  It is anticipated that by next session her pain she be down to a 5-6/10 pre treatment.  -APRIL     PT Plan    PT Plan Comments if pain is reduced will initiate light strengthening activities of the core and hip, if her pain is increased than will anticipate trying additional lumbar/hip manual techniques in addition to mobility.  -APRIL       User Key  (r) = Recorded By, (t) = Taken By, (c) = Cosigned By    Initials Name Provider Type    APRIL Harvey, PT Physical Therapist                    Exercises       08/10/17 1000          Subjective Comments    Subjective Comments She states that she was mowing her yard on Tuesday when she was feeling good and she had increased pain on both Tuesday night and Wednesday.  THe pain is a little better this morning but remains elevated when compared to previous session.  -APRIL      Subjective Pain    Able to rate subjective pain? yes  -APRIL      Pre-Treatment Pain Level 7  -APRIL      Subjective Pain Comment Last night it was a 9/10.  -APRIL      Exercise 1    Exercise Name 1 NuStep L5  -APRIL      Time (Minutes) 1 5  -APRIL      Exercise 2    Exercise Name 2 seated piriformis stretch: pullup/push down  -APRIL      Sets 2 3  -APRIL      Time (Seconds) 2 30  -APRIL      Additional Comments each  -APRIL      Exercise 3    Exercise Name 3 LTR  -APRIL      Reps 3 10  -APRIL      Exercise 4    Exercise Name 4 supine piriformis stretch  -APRIL      Sets 4 3  -APRIL      Time (Seconds) 4 30  -APRIL      Exercise 5    Exercise Name 5 wall lean stretch to the left  -APRIL      Sets 5 5  -APRIL      Time (Seconds) 5 10  -APRIL      Exercise 6    Exercise Name 6 seated peroneal nerve glide  -APRIL      Reps 6 10  -APRIL      Time (Seconds) 6 5  -APRIL        User Key  (r) = Recorded By, (t) = Taken By, (c) = Cosigned By    Initials Name Provider  Type    APRIL Ezio Harvey, PT Physical Therapist                        Manual Rx (last 36 hours)      Manual Treatments       08/10/17 1100          Manual Rx 1    Manual Rx 1 Location LLE  -APRIL      Manual Rx 1 Type LAD/lat distraction with gait belt/lat distraction with belt with hip flexion.  -APRIL      Manual Rx 1 Grade III/IV  -APRIL      Manual Rx 1 Duration 14 minutes  -APRIL        User Key  (r) = Recorded By, (t) = Taken By, (c) = Cosigned By    Initials Name Provider Type    APRIL Harvey, PT Physical Therapist                    Therapy Education       08/10/17 1133          Therapy Education    Education Details Added seated peroneal nerve glides to HEP.  -APRIL      Given HEP  -APRIL      Program New  -APRIL      How Provided Verbal;Demonstration;Written  -APRIL      Provided to Patient  -APRIL      Level of Understanding Verbalized;Demonstrated  -APRIL        User Key  (r) = Recorded By, (t) = Taken By, (c) = Cosigned By    Initials Name Provider Type    APRIL Harvey, PT Physical Therapist                Time Calculation:   Start Time: 1045  Total Timed Code Minutes- PT: 44 minute(s)    Therapy Charges for Today     Code Description Service Date Service Provider Modifiers Qty    96341270788  PT THER PROC EA 15 MIN 8/10/2017 Ezio Harvey, PT GP 2    08868355187 HC PT MANUAL THERAPY EA 15 MIN 8/10/2017 Ezio Harvey, PT GP 1                    Ezio Harvey, PT  8/10/2017

## 2017-08-15 ENCOUNTER — HOSPITAL ENCOUNTER (OUTPATIENT)
Dept: PHYSICAL THERAPY | Facility: HOSPITAL | Age: 82
Setting detail: THERAPIES SERIES
Discharge: HOME | End: 2017-08-15

## 2017-08-15 ENCOUNTER — TELEPHONE (OUTPATIENT)
Dept: PAIN MEDICINE | Facility: CLINIC | Age: 82
End: 2017-08-15

## 2017-08-15 DIAGNOSIS — M54.40 CHRONIC BILATERAL LOW BACK PAIN WITH SCIATICA, SCIATICA LATERALITY UNSPECIFIED: Primary | ICD-10-CM

## 2017-08-15 DIAGNOSIS — G89.29 CHRONIC BILATERAL LOW BACK PAIN WITH SCIATICA, SCIATICA LATERALITY UNSPECIFIED: Primary | ICD-10-CM

## 2017-08-15 PROCEDURE — 97110 THERAPEUTIC EXERCISES: CPT | Performed by: PHYSICAL THERAPIST

## 2017-08-15 NOTE — TELEPHONE ENCOUNTER
"Patient doesn't want to be seen because of the amount of arthritis in her back. She will get referral again if she does.     Chief Complaint: \"Low back pain with pain shooting down leg, thigh, calf and hip\"    History of Present Illness:   Patient: Ms. Yuni Mlcain, 82 y.o. female   Referring physician: Dr. Hansen    Review of diagnostic Studies:    05/21/2015 Xray Hip with or without Pelvis  AP pelvis, hip 2 views: Right: minimal arthritic findings with preservation of the joint space; Left: minimal arthritic findings with preservation of the joint space    10/28/2016 MRI Left shoulder without Contrast  There is a large joint effusion, and a 3.5 cm collection of  fluid along the anterior margin of the subscapularis with questionable  connection to fluid in the subdeltoid bursa. There is a moderate-sized  bursal fluid collection. Coronal images although motion degraded show a  roughly 6 or 7 mm irregular defect in the supraspinatus tendon distally,  probably effective disruption, although there may be a few very small  remaining fibers extending to the insertion. There is no evidence of  asymmetric atrophy of the supraspinatus, although there is generalized  muscle loss of the shoulder. There is diffuse edema of the distal  supraspinatus and infraspinatus tendons, although no infraspinatus tear  is appreciated. The distal subscapularis tendon is diffusely thickened  but not apparently disrupted. Irregular signal within the substance of  the distal long head biceps tendon suggests possible split tear.  Abnormal signal extends all the way up to the level of the biceps  anchor, however, no definite labral tear is identified. There are  multiple traction cysts of the superolateral humerus, and a couple  degenerative cysts of the superior glenoid. There is moderate AC joint  arthropathy. No evidence of acute bony trauma is seen.      "

## 2017-08-22 ENCOUNTER — HOSPITAL ENCOUNTER (OUTPATIENT)
Dept: PHYSICAL THERAPY | Facility: HOSPITAL | Age: 82
Setting detail: THERAPIES SERIES
Discharge: HOME | End: 2017-08-22

## 2017-08-22 DIAGNOSIS — M54.40 CHRONIC BILATERAL LOW BACK PAIN WITH SCIATICA, SCIATICA LATERALITY UNSPECIFIED: Primary | ICD-10-CM

## 2017-08-22 DIAGNOSIS — G89.29 CHRONIC BILATERAL LOW BACK PAIN WITH SCIATICA, SCIATICA LATERALITY UNSPECIFIED: Primary | ICD-10-CM

## 2017-08-22 PROCEDURE — 97110 THERAPEUTIC EXERCISES: CPT | Performed by: PHYSICAL THERAPIST

## 2017-08-22 PROCEDURE — 97140 MANUAL THERAPY 1/> REGIONS: CPT | Performed by: PHYSICAL THERAPIST

## 2017-08-22 NOTE — THERAPY TREATMENT NOTE
Outpatient Physical Therapy Ortho Treatment Note   Soo     Patient Name: Yuni Mclain  : 1935  MRN: [id number]  Today's Date: 2017      Visit Date: 2017    Visit Dx:    ICD-10-CM ICD-9-CM   1. Chronic bilateral low back pain with sciatica, sciatica laterality unspecified M54.40 724.2    G89.29 724.3     338.29       Patient Active Problem List   Diagnosis   • Atrial contractions, premature   • Hypercholesterolemia   • Benign hypertension   • Chest pain   • Mitral regurgitation   • Palpitations        Past Medical History:   Diagnosis Date   • Atrial contractions, premature    • Benign hypertension    • Chest pain    • Esophagitis    • Heart disease    • Hypercholesterolemia    • Mitral regurgitation     Mild mitral regurgitation by echocardiography.   • Osteoarthritis    • Palpitations     Palpitations.  Reviewed the results of her Zio monitor with her.  Again, they are premature atrial contractions with no arrhythmias or pauses.  Will continue with flecainide, provided refills, and see her back in 8 months.    • Pneumonia    • UTI (urinary tract infection)         Past Surgical History:   Procedure Laterality Date   • APPENDECTOMY     • BACK SURGERY      Low   • BLADDER SURGERY     • BREAST SURGERY      mammoplasty-reduction   • BUNIONECTOMY Bilateral    • CARDIAC SURGERY      cardiac stent x 1 ()   • CARPAL TUNNEL RELEASE Bilateral    • EYE SURGERY      retina patch bilat ()   • HYSTERECTOMY     • KNEE ARTHROPLASTY Right    • REPLACEMENT TOTAL KNEE Right    • SHOULDER ARTHROSCOPY W/ ROTATOR CUFF REPAIR Left 2017    Procedure: SHOULDER ARTHROSCOPY WITH ROTATOR CUFF REPAIR LEFT;  Surgeon: Parrish Bright MD;  Location: [facility];  Service:                              PT Assessment/Plan       17 1300       PT Assessment    Assessment Comments Patient tolerates treatment well today.  THere is no significant pain upon standing after treatment today for the first  time.  She is compliant with current HEP.  -APRIL     PT Plan    PT Plan Comments reassessment next week.  -APRIL       User Key  (r) = Recorded By, (t) = Taken By, (c) = Cosigned By    Initials Name Provider Type    APRIL Harvey, PT Physical Therapist                    Exercises       08/22/17 1000          Subjective Comments    Subjective Comments Patient states that she continues to improve and work on HEP.  She states her worse times are in the early morning and late evening.  She states she was going up the stairs at home and had a sharp pain.  She flexed over and went up on all fours and had no pain.  -APRIL      Subjective Pain    Able to rate subjective pain? yes  -APRIL      Pre-Treatment Pain Level 3  -APRIL      Subjective Pain Comment 6-7 this morning  -APRIL      Exercise 1    Exercise Name 1 NuStep L5  -APRIL      Time (Minutes) 1 5  -APRIL      Exercise 2    Exercise Name 2 seated piriformis stretch: pullup/push down  -APRIL      Sets 2 3  -APRIL      Time (Seconds) 2 30  -APRIL      Additional Comments each  -APRIL      Exercise 3    Exercise Name 3 LTR  -APRIL      Reps 3 10  -APRIL      Exercise 4    Exercise Name 4 supine piriformis stretch  -APRIL      Sets 4 3  -APRIL      Time (Seconds) 4 30  -APRIL      Exercise 6    Exercise Name 6 seated peroneal nerve glide  -APRIL      Reps 6 10  -APRIL      Time (Seconds) 6 5  -APRIL      Exercise 7    Exercise Name 7 hooklying clams  -APRIL      Reps 7 15  -APRIL      Additional Comments green band  -APRIL      Exercise 8    Exercise Name 8 Supine heel slides for knee AROM and increased hip flexion/ext, hip abd/add plate slides  -APRIL      Reps 8 10  -APRIL        User Key  (r) = Recorded By, (t) = Taken By, (c) = Cosigned By    Initials Name Provider Type    APRIL Harvey, PT Physical Therapist                        Manual Rx (last 36 hours)      Manual Treatments       08/22/17 1300          Manual Rx 1    Manual Rx 1 Location LLE  -APRIL      Manual Rx 1 Type LAD/lat distraction with gait belt/lat distraction with  belt with hip flexion.  -APRIL      Manual Rx 1 Duration 12 minutes  -APRIL      Manual Rx 2    Manual Rx 2 Location LLE  -APRIL      Manual Rx 2 Type MWM into L hip flexion to end range pain free; did not add over pressure today; unable to achieve full AROM pain free. 3x   -APRIL        User Key  (r) = Recorded By, (t) = Taken By, (c) = Cosigned By    Initials Name Provider Type    APRIL Harvey PT Physical Therapist                    Therapy Education       08/22/17 1336          Therapy Education    Education Details Patient was given a green band and calmshells were added to her HEP  -APRIL      Given HEP  -APRIL      Program New  -APRIL      How Provided Verbal;Demonstration;Written  -APRIL      Provided to Patient  -APRIL      Level of Understanding Verbalized;Demonstrated  -APRIL        User Key  (r) = Recorded By, (t) = Taken By, (c) = Cosigned By    Initials Name Provider Type    APRIL Harvey, PT Physical Therapist                Time Calculation:   Start Time: 1015  Total Timed Code Minutes- PT: 39 minute(s)    Therapy Charges for Today     Code Description Service Date Service Provider Modifiers Qty    93331238534  PT THER PROC EA 15 MIN 8/22/2017 Ezio Harvey, PT GP 2    [id number]  PT MANUAL THERAPY EA 15 MIN 8/22/2017 Ezio Harvey, PT GP 1                    Ezio Harvey, PT  8/22/2017

## 2017-08-23 ENCOUNTER — OFFICE VISIT (OUTPATIENT)
Dept: CARDIOLOGY | Facility: CLINIC | Age: 82
End: 2017-08-23

## 2017-08-23 VITALS
HEIGHT: 66 IN | WEIGHT: 157.6 LBS | BODY MASS INDEX: 25.33 KG/M2 | SYSTOLIC BLOOD PRESSURE: 168 MMHG | HEART RATE: 69 BPM | DIASTOLIC BLOOD PRESSURE: 88 MMHG

## 2017-08-23 DIAGNOSIS — I10 BENIGN HYPERTENSION: ICD-10-CM

## 2017-08-23 DIAGNOSIS — I49.1 ATRIAL CONTRACTIONS, PREMATURE: Primary | ICD-10-CM

## 2017-08-23 PROCEDURE — 93000 ELECTROCARDIOGRAM COMPLETE: CPT | Performed by: INTERNAL MEDICINE

## 2017-08-23 PROCEDURE — 99213 OFFICE O/P EST LOW 20 MIN: CPT | Performed by: INTERNAL MEDICINE

## 2017-08-23 RX ORDER — FLECAINIDE ACETATE 50 MG/1
50 TABLET ORAL 2 TIMES DAILY
Qty: 180 TABLET | Refills: 3 | Status: SHIPPED | OUTPATIENT
Start: 2017-08-23 | End: 2018-08-29 | Stop reason: SDUPTHER

## 2017-08-23 RX ORDER — HYDROCHLOROTHIAZIDE 25 MG/1
25 TABLET ORAL DAILY
Qty: 90 TABLET | Refills: 3 | Status: SHIPPED | OUTPATIENT
Start: 2017-08-23 | End: 2018-08-29 | Stop reason: SDUPTHER

## 2017-08-23 NOTE — PROGRESS NOTES
"[facility] [facility] [facility]  Office visit  Yuni Mclain  1935  [phone]    VISIT DATE:  08/23/2017    PCP: Aristides Taylor MD  [address] ATTN: [place] [address]    CC:  Chief Complaint   Patient presents with   • atrial contractions     follow up       PROBLEM LIST:  1. Premature atrial contractions and premature ventricular contractions:  a. Recent Zio monitoring confirming PACs, no arrhythmias, no pauses.  2. Hypercholesterolemia.  3. Benign hypertension.  4. Chest pain:  a. Mild to moderate coronary artery disease by catheterization, 2003, medically managed.  b. Recurrent episode since November using nitroglycerin x1.  5. Mild mitral regurgitation by echocardiography.      ASSESSMENT:   Diagnosis Plan   1. Atrial contractions, premature     2. Benign hypertension         PLAN:  Continue flecainide 50 mg by mouth twice a day  Continue hydrochlorothiazide 25 mg by mouth daily  Continue low-dose aspirin  Continue niacin 500 mg by mouth daily    Subjective  Reports only rare episodes of palpitations which she describes as isolated heartbeats usually rest.  But cannot appears to be controlling her symptomatic premature atrial contractions very well.  Blood pressures tender run less than 140/90 mmHg at home.  Her blood pressure was slightly elevated the office due to increased anxiety.  Maintaining an active lifestyle.  Gardening without difficulty.  Walks frequently.  Compliant with medical therapy.    PHYSICAL EXAMINATION:  Vitals:    08/23/17 1251   BP: 168/88   BP Location: Left arm   Patient Position: Sitting   Pulse: 69   Weight: 157 lb 9.6 oz (71.5 kg)   Height: 66\" (167.6 cm)     General Appearance:    Alert, cooperative, no distress, appears stated age   Head:    Normocephalic, without obvious abnormality, atraumatic   Eyes:    conjunctiva/corneas clear   Nose:   Nares normal, septum midline, mucosa normal, no drainage   Throat:   Lips, teeth and gums normal "   Neck:   Supple, symmetrical, trachea midline, no carotid    bruit or JVD   Lungs:     Clear to auscultation bilaterally, respirations unlabored   Chest Wall:    No tenderness or deformity    Heart:    Regular rate and rhythm, S1 and S2 normal, 2/6 early peaking systolic murmur right upper sternal border, rub   or gallop, normal carotid impulse bilaterally without bruit.   Abdomen:     Soft, non-tender   Extremities:   Extremities normal, atraumatic, no cyanosis or edema   Pulses:   2+ and symmetric all extremities   Skin:   Skin color, texture, turgor normal, no rashes or lesions       Diagnostic Data:    ECG 12 Lead  Date/Time: 8/23/2017 1:09 PM  Performed by: JAVIER KINGSTON III  Authorized by: JAVIER KINGSTON III   Rhythm: sinus rhythm  Rate: normal  Conduction: conduction normal  ST Segments: ST segments normal  T Waves: T waves normal  QRS axis: normal  Clinical impression: normal ECG          No results found for: CHLPL, TRIG, HDL, LDLDIRECT  Lab Results   Component Value Date    GLUCOSE 93 01/05/2017    BUN 10 01/05/2017    CREATININE 0.70 01/05/2017     01/05/2017    K 3.9 01/05/2017     01/05/2017    CO2 27.0 01/05/2017     Lab Results   Component Value Date    HGBA1C 4.8 07/05/2015     Lab Results   Component Value Date    WBC 6.78 01/05/2017    HGB 12.9 01/05/2017    HCT 38.6 01/05/2017     01/05/2017       Allergies  Allergies   Allergen Reactions   • Albuterol      Unknown reactions   • Statins Myalgia   • Demerol [Meperidine] Itching   • Sulfa Antibiotics Itching       Current Medications    Current Outpatient Prescriptions:   •  aspirin 81 MG EC tablet, Take 81 mg by mouth Daily., Disp: , Rfl:   •  Famotidine (PEPCID PO), Take 25 mg by mouth Daily., Disp: , Rfl:   •  flecainide (TAMBOCOR) 50 MG tablet, Take 1 tablet by mouth 2 (Two) Times a Day., Disp: 180 tablet, Rfl: 3  •  folic acid (FOLVITE) 400 MCG tablet, Take 400 mcg by mouth Daily., Disp: , Rfl:   •  hydrochlorothiazide  (HYDRODIURIL) 25 MG tablet, Take 1 tablet by mouth Daily., Disp: 90 tablet, Rfl: 3  •  HYDROcodone-acetaminophen (NORCO) 7.5-325 MG per tablet, Take 1-2 tablets by mouth Every 4 (Four) Hours As Needed for moderate pain (4-6) (Pain)., Disp: 51 tablet, Rfl: 0  •  niacin 500 MG tablet, Take 1 tablet by mouth Every Night., Disp: 90 tablet, Rfl: 3  •  promethazine (PHENERGAN) 12.5 MG tablet, Take 1 tablet by mouth Every 8 (Eight) Hours As Needed for nausea or vomiting., Disp: 40 tablet, Rfl: 0          ROS  Review of Systems   HENT: Positive for hearing loss.    Eyes: Positive for vision loss in left eye and vision loss in right eye.   Cardiovascular: Positive for irregular heartbeat and palpitations.   Skin: Positive for skin cancer.   Musculoskeletal: Positive for arthritis, back pain, muscle cramps, neck pain and stiffness.   Genitourinary: Positive for frequency.       SOCIAL HX  Social History     Social History   • Marital status:      Spouse name: N/A   • Number of children: N/A   • Years of education: N/A     Occupational History   • Not on file.     Social History Main Topics   • Smoking status: Never Smoker   • Smokeless tobacco: Never Used   • Alcohol use Yes      Comment: occasionally   • Drug use: No   • Sexual activity: Defer     Other Topics Concern   • Not on file     Social History Narrative       FAMILY HX  Family History   Problem Relation Age of Onset   • Heart disease Mother    • Cancer Father    • Arthritis Father              Lewis Echols III, MD, [facility]

## 2017-08-29 ENCOUNTER — HOSPITAL ENCOUNTER (OUTPATIENT)
Dept: PHYSICAL THERAPY | Facility: HOSPITAL | Age: 82
Setting detail: THERAPIES SERIES
Discharge: HOME | End: 2017-08-29

## 2017-08-29 DIAGNOSIS — M54.40 CHRONIC BILATERAL LOW BACK PAIN WITH SCIATICA, SCIATICA LATERALITY UNSPECIFIED: Primary | ICD-10-CM

## 2017-08-29 DIAGNOSIS — G89.29 CHRONIC BILATERAL LOW BACK PAIN WITH SCIATICA, SCIATICA LATERALITY UNSPECIFIED: Primary | ICD-10-CM

## 2017-08-29 PROCEDURE — 97110 THERAPEUTIC EXERCISES: CPT | Performed by: PHYSICAL THERAPIST

## 2017-08-29 PROCEDURE — G8984 CARRY CURRENT STATUS: HCPCS | Performed by: PHYSICAL THERAPIST

## 2017-08-29 PROCEDURE — G8985 CARRY GOAL STATUS: HCPCS | Performed by: PHYSICAL THERAPIST

## 2017-08-29 NOTE — THERAPY PROGRESS REPORT/RE-CERT
Outpatient Physical Therapy Ortho Re-Assessment   [place]     Patient Name: Yuni Mclain  : 1935  MRN: [id number]  Today's Date: 2017      Visit Date: 2017    Patient Active Problem List   Diagnosis   • Atrial contractions, premature   • Hypercholesterolemia   • Benign hypertension   • Chest pain   • Mitral regurgitation   • Palpitations        Past Medical History:   Diagnosis Date   • Atrial contractions, premature    • Benign hypertension    • Chest pain    • Esophagitis    • Heart disease    • Hypercholesterolemia    • Mitral regurgitation     Mild mitral regurgitation by echocardiography.   • Osteoarthritis    • Palpitations     Palpitations.  Reviewed the results of her Zio monitor with her.  Again, they are premature atrial contractions with no arrhythmias or pauses.  Will continue with flecainide, provided refills, and see her back in 8 months.    • Pneumonia    • UTI (urinary tract infection)         Past Surgical History:   Procedure Laterality Date   • APPENDECTOMY     • BACK SURGERY      Low   • BLADDER SURGERY     • BREAST SURGERY      mammoplasty-reduction   • BUNIONECTOMY Bilateral    • CARDIAC SURGERY      cardiac stent x 1 ()   • CARPAL TUNNEL RELEASE Bilateral    • EYE SURGERY      retina patch bilat ()   • HYSTERECTOMY     • KNEE ARTHROPLASTY Right    • REPLACEMENT TOTAL KNEE Right    • SHOULDER ARTHROSCOPY W/ ROTATOR CUFF REPAIR Left 2017    Procedure: SHOULDER ARTHROSCOPY WITH ROTATOR CUFF REPAIR LEFT;  Surgeon: Parrish Bright MD;  Location: [facility];  Service:        Visit Dx:     ICD-10-CM ICD-9-CM   1. Chronic bilateral low back pain with sciatica, sciatica laterality unspecified M54.40 724.2    G89.29 724.3     338.29                 PT Ortho       17 1000    Posture/Observations    Posture/Observations Comments hips shifted left with right sidebend mild.  -APRIL    Quarter Clearing    Quarter Clearing Lower Quarter Clearing  -APRIL    DTR-  Lower Quarter Clearing    Patellar tendon (L2-4) Bilateral:;2- Normal response  -APRIL    Achilles tendon (S1-2) Bilateral:;2- Normal response  -APRIL    Neural Tension Signs- Lower Quarter Clearing    Slump Negative  -APRIL    SLR Negative  -APRIL    Pathological Reflexes- Lower Quarter Clearing    Clonus Negative  -APRIL    Iglesias Negative  -APRIL    Sensory Screen for Light Touch- Lower Quarter Clearing    L1 (inguinal area) Intact  -APRIL    L2 (anterior mid thigh) Intact  -APRIL    L3 (distal anterior thigh) Intact  -APRIL    L4 (medial lower leg/foot) Intact  -APRIL    L5 (lateral lower leg/great toe) Intact  -APRIL    S1 (bottom of foot) Intact  -APRIL    Myotomal Screen- Lower Quarter Clearing    Hip flexion (L2) Left:;4+ (Good +);Right:;5 (Normal)  -APRIL    Knee extension (L3) Bilateral:;5 (Normal)  -APRIL    Ankle DF (L4) Bilateral:;5 (Normal)  -APRIL    Great toe extension (L5) Bilateral:;WNL  -APRIL    Ankle PF (S1) Bilateral:;3 (Fair)  -APRIL    Knee flexion (S2) Bilateral:;5 (Normal)  -APRIL    Lumbar ROM Screen- Lower Quarter Clearing    Lumbar Flexion Impaired   70 degrees with knee bend  -APRIL    Lumbar Extension Impaired   10 degrees with mild pain  -APRIL    Lumbar Lateral Flexion Normal  -APRIL    SI/Hip Screen- Lower Quarter Clearing    Cecilia's/John's test Bilateral:;Positive  -APRIL    Posterior thigh sheer Bilateral:;Negative  -APRIL    Special Tests/Palpation    Special Tests/Palpation Hip;Lumbar/SI  -APRIL    Lumbosacral Palpation    Lumbosacral Segment Tender;Bilateral:;Guarded/taut  -APRIL    Ischial Tuberosity --   nontender  -APRIL    Lumbosacral Palpation? Yes  -APRIL    Lumbar/SI Special Tests    SI Compression Test (SI Dysfunction) Left:;Positive  -APRIL    Hip/Thigh Palpation    Greater Trochanter Left:   not tender  -APRIL    Piriformis Left:;Guarded/taut;Tender  -APRIL    SI Left:;Tender  -APRIL    Hip/Thigh Palpation? Yes  -APRIL    Flexibility    Flexibility Tested? Lower Extremity  -APRIL    Lower Extremity Flexibility    Hamstrings Bilateral:;Mildly limited  -APRIL    Hip  Flexors Bilateral:;Moderately limited  -APRIL    Hip External Rotators Right:;Mildly limited  -APRIL    Hip Internal Rotators Left:;Severely limited;Right:;Moderately limited  -APRIL      User Key  (r) = Recorded By, (t) = Taken By, (c) = Cosigned By    Initials Name Provider Type    APRIL Harvey, PT Physical Therapist                            Therapy Education       08/29/17 1118          Therapy Education    Education Details Added standing hip ext/abd and sit to stand to HEP  -APRIL      Given HEP  -APRIL      Program New  -APRIL      How Provided Verbal;Demonstration;Written  -APRIL      Provided to Patient  -APRIL      Level of Understanding Verbalized;Demonstrated  -APRIL        User Key  (r) = Recorded By, (t) = Taken By, (c) = Cosigned By    Initials Name Provider Type    APRIL Harvey, PT Physical Therapist                PT OP Goals       08/29/17 1000       PT Short Term Goals    STG Date to Achieve 08/18/17  -APRIL     STG 1 Patient to tolerate entire session with no pain greater than 6/10  -APIRL     STG 1 Progress Met  -APRIL     STG 2 Patient to be compliant with initial HEP  -APRIL     STG 2 Progress Met  -APRIL     Long Term Goals    LTG Date to Achieve 09/01/17  -APRIL     LTG 1 Patient to be independent with final HEP  -APRIL     LTG 1 Progress Ongoing  -APRIL     LTG 2 Patient to report pain 0/10 intermittently  -APRIL     LTG 2 Progress Ongoing  -APRIL     LTG 3 Patient to stand after at least 10 minutes supine activities with no significant increase in pain  -APRIL     LTG 3 Progress Partially Met;Ongoing  -APRIL     LTG 4 Patient to demonstrate lumbar flexion at least 70  -APRIL     LTG 4 Progress Met  -APRIL     LTG 5 Patient to improve Mod Oswestry to at least 38%  -APRIL     LTG 5 Progress Met  -APRIL     Time Calculation    PT Goal Re-Cert Due Date 09/28/17  -APRIL       User Key  (r) = Recorded By, (t) = Taken By, (c) = Cosigned By    Initials Name Provider Type    APRIL Harvey, PT Physical Therapist                PT Assessment/Plan       08/29/17  1100       PT Assessment    Functional Limitations Impaired gait;Performance in self-care ADL;Performance in leisure activities  -APRIL     Impairments Range of motion;Posture;Pain;Locomotion;Gait  -APRIL     Assessment Comments Patient reports that her pain is better generally but she continues to have intermittent flareups after new activities.  She is very compliant and independent with exercise and she would like to transition to I HEP as able.  -APRIL     Please refer to paper survey for additional self-reported information Yes  -APRIL     Rehab Potential Good  -APRIL     Patient/caregiver participated in establishment of treatment plan and goals Yes  -APRIL     Patient would benefit from skilled therapy intervention Yes  -APRIL     PT Plan    PT Frequency 1x/week  -APRIL     Predicted Duration of Therapy Intervention (days/wks) PRN x 4 weeks  -APRIL     PT Plan Comments Patient to continue I HEP and if she is effectively able to manage her symptoms over at least 3 weeks she will be discharged.  If she has a pain increase she will follow up and continue with progression per original POC.  -APRIL       User Key  (r) = Recorded By, (t) = Taken By, (c) = Cosigned By    Initials Name Provider Type    APRIL Ezio Harvey, PT Physical Therapist                  Exercises       08/29/17 1000          Subjective Comments    Subjective Comments Patient reports that her pain was severe on Sunday.  The worst of her pain seems to radiate from the left hip region into the lateral ankle.  Her pain is gerenally improved from 3-4 weeks ago but she continues with intermittent flareups that are fairly significant.  -APRIL      Subjective Pain    Able to rate subjective pain? yes  -APRIL      Pre-Treatment Pain Level 5  -APRIL      Exercise 1    Exercise Name 1 NuStep L5  -APRIL      Time (Minutes) 1 5  -APRIL      Exercise 2    Exercise Name 2 seated piriformis stretch: pullup/push down  -APRIL      Sets 2 3  -APRIL      Time (Seconds) 2 30  -APRIL      Exercise 3    Exercise Name 3  standing hip abd/ext  -APRIL      Reps 3 10  -APRIL      Additional Comments each at sink  -APRIL      Exercise 4    Exercise Name 4 sit to stand from low surface  -APRIL      Sets 4 2  -APRIL      Reps 4 10  -APRIL      Exercise 6    Exercise Name 6 seated peroneal nerve glide  -APRIL      Reps 6 10  -APRIL      Time (Seconds) 6 5  -APRIL      Exercise 7    Exercise Name 7 hooklying clams  -APRIL      Reps 7 15  -APRIL      Additional Comments green band  -APRIL        User Key  (r) = Recorded By, (t) = Taken By, (c) = Cosigned By    Initials Name Provider Type    APRIL Harvey, PT Physical Therapist                              Outcome Measures       08/29/17 1100          Modified Oswestry    Modified Oswestry Score/Comments 36%  -APRIL      Functional Assessment    Outcome Measure Options Modifed Owestry  -APRIL        User Key  (r) = Recorded By, (t) = Taken By, (c) = Cosigned By    Initials Name Provider Type    APRIL Harvey, PT Physical Therapist            Time Calculation:   Start Time: 1024  Total Timed Code Minutes- PT: 39 minute(s)     Therapy Charges for Today     Code Description Service Date Service Provider Modifiers Qty    53547821134 HC PT CARRY MOV HAND OBJ CURRENT 8/29/2017 Ezio Harvey, PT GP,  1    00573691111 HC PT CARRY MOV HAND OBJ PROJECTED 8/29/2017 Ezio Harvey, PT GP,  1    62422939925 HC PT THER PROC EA 15 MIN 8/29/2017 Ezio Harvey, PT GP 3          PT G-Codes  PT Professional Judgement Used?: Yes  Outcome Measure Options: Modifed Owestry  Score: 36%  Functional Limitation: Carrying, moving and handling objects  Carrying, Moving and Handling Objects Current Status (): At least 20 percent but less than 40 percent impaired, limited or restricted  Carrying, Moving and Handling Objects Goal Status (): At least 20 percent but less than 40 percent impaired, limited or restricted         Ezio Harvey, PT  8/29/2017

## 2017-09-05 ENCOUNTER — APPOINTMENT (OUTPATIENT)
Dept: PHYSICAL THERAPY | Facility: HOSPITAL | Age: 82
End: 2017-09-05

## 2017-09-08 ENCOUNTER — DOCUMENTATION (OUTPATIENT)
Dept: PHYSICAL THERAPY | Facility: HOSPITAL | Age: 82
End: 2017-09-08

## 2017-09-08 DIAGNOSIS — G89.29 CHRONIC BILATERAL LOW BACK PAIN WITH SCIATICA, SCIATICA LATERALITY UNSPECIFIED: Primary | ICD-10-CM

## 2017-09-08 DIAGNOSIS — M54.40 CHRONIC BILATERAL LOW BACK PAIN WITH SCIATICA, SCIATICA LATERALITY UNSPECIFIED: Primary | ICD-10-CM

## 2017-09-08 NOTE — THERAPY DISCHARGE NOTE
Outpatient Physical Therapy Discharge Summary         Patient Name: Yuni Mclain  : 1935  MRN: [id number]    Today's Date: 2017    Visit Dx:    ICD-10-CM ICD-9-CM   1. Chronic bilateral low back pain with sciatica, sciatica laterality unspecified M54.40 724.2    G89.29 724.3     338.29             PT OP Goals       17 0800       PT Short Term Goals    STG Date to Achieve 17  -APRIL     STG 1 Patient to tolerate entire session with no pain greater than 6/10  -APRIL     STG 1 Progress Met  -APRIL     STG 2 Patient to be compliant with initial HEP  -APRIL     STG 2 Progress Met  -APRIL     Long Term Goals    LTG Date to Achieve 17  -APRIL     LTG 1 Patient to be independent with final HEP  -APRIL     LTG 1 Progress Met  -APRIL     LTG 2 Patient to report pain 0/10 intermittently  -APRIL     LTG 2 Progress Not Met  -APRIL     LTG 3 Patient to stand after at least 10 minutes supine activities with no significant increase in pain  -APRIL     LTG 3 Progress Partially Met;Ongoing  -APRIL     LTG 4 Patient to demonstrate lumbar flexion at least 70  -APRIL     LTG 4 Progress Met  -APRIL     LTG 5 Patient to improve Mod Oswestry to at least 38%  -APRIL     LTG 5 Progress Met  -APRIL       User Key  (r) = Recorded By, (t) = Taken By, (c) = Cosigned By    Initials Name Provider Type    APRIL Harvey, PT Physical Therapist               / visits attended.    Ezio Harvey, PT  2017

## 2017-09-12 ENCOUNTER — APPOINTMENT (OUTPATIENT)
Dept: PHYSICAL THERAPY | Facility: HOSPITAL | Age: 82
End: 2017-09-12

## 2017-09-19 ENCOUNTER — APPOINTMENT (OUTPATIENT)
Dept: PHYSICAL THERAPY | Facility: HOSPITAL | Age: 82
End: 2017-09-19

## 2017-09-26 ENCOUNTER — APPOINTMENT (OUTPATIENT)
Dept: PHYSICAL THERAPY | Facility: HOSPITAL | Age: 82
End: 2017-09-26

## 2018-08-28 ENCOUNTER — HOSPITAL ENCOUNTER (OUTPATIENT)
Age: 83
End: 2018-08-28
Payer: MEDICARE

## 2018-08-28 DIAGNOSIS — Q74.0: Primary | ICD-10-CM

## 2018-08-28 DIAGNOSIS — M89.311: ICD-10-CM

## 2018-08-28 PROCEDURE — 73218 MRI UPPER EXTREMITY W/O DYE: CPT

## 2018-08-29 ENCOUNTER — OFFICE VISIT (OUTPATIENT)
Dept: CARDIOLOGY | Facility: CLINIC | Age: 83
End: 2018-08-29

## 2018-08-29 VITALS
HEIGHT: 66 IN | BODY MASS INDEX: 23.78 KG/M2 | DIASTOLIC BLOOD PRESSURE: 80 MMHG | HEART RATE: 68 BPM | SYSTOLIC BLOOD PRESSURE: 140 MMHG | WEIGHT: 148 LBS

## 2018-08-29 DIAGNOSIS — E78.00 HYPERCHOLESTEROLEMIA: ICD-10-CM

## 2018-08-29 DIAGNOSIS — I49.1 ATRIAL CONTRACTIONS, PREMATURE: Primary | ICD-10-CM

## 2018-08-29 DIAGNOSIS — I10 BENIGN HYPERTENSION: ICD-10-CM

## 2018-08-29 DIAGNOSIS — I34.0 NON-RHEUMATIC MITRAL REGURGITATION: ICD-10-CM

## 2018-08-29 PROCEDURE — 99213 OFFICE O/P EST LOW 20 MIN: CPT | Performed by: INTERNAL MEDICINE

## 2018-08-29 PROCEDURE — 93000 ELECTROCARDIOGRAM COMPLETE: CPT | Performed by: INTERNAL MEDICINE

## 2018-08-29 RX ORDER — FLECAINIDE ACETATE 50 MG/1
50 TABLET ORAL EVERY 12 HOURS SCHEDULED
Qty: 180 TABLET | Refills: 3 | Status: SHIPPED | OUTPATIENT
Start: 2018-08-29 | End: 2019-03-06 | Stop reason: SDUPTHER

## 2018-08-29 RX ORDER — HYDROCHLOROTHIAZIDE 25 MG/1
25 TABLET ORAL DAILY
Qty: 90 TABLET | Refills: 3 | Status: SHIPPED | OUTPATIENT
Start: 2018-08-29 | End: 2019-03-06 | Stop reason: SDUPTHER

## 2018-08-29 RX ORDER — ACETAMINOPHEN 325 MG/1
650 TABLET ORAL EVERY 6 HOURS PRN
COMMUNITY
End: 2019-06-10

## 2018-08-29 NOTE — PROGRESS NOTES
[facility] at [facility]  Office visit  Yuni Mclain  1935  [phone]    VISIT DATE:  08/23/2017    PCP: Aristides Taylor MD  [address] ATTN: [place] [address]    CC:  No chief complaint on file.      PROBLEM LIST:  1. Premature atrial contractions and premature ventricular contractions:  a. Recent Zio monitoring confirming PACs, no arrhythmias, no pauses.  2. Hypercholesterolemia.  3. Benign hypertension.  4. Chest pain:  a. Mild to moderate coronary artery disease by catheterization, 2003, medically managed.  b. Recurrent episode since November using nitroglycerin x1.  5. Mild mitral regurgitation by echocardiography.      ASSESSMENT:   Diagnosis Plan   1. Atrial contractions, premature     2. Hypercholesterolemia     3. Benign hypertension     4. Non-rheumatic mitral regurgitation         PLAN:  Premature atrial contractions: Continue flecainide 50 mg by mouth twice a day  Hypertension: Continue hydrochlorothiazide 25 mg by mouth daily  Dyslipidemia: Continue niacin 500 mg by mouth daily    Subjective  Reports only rare episodes of palpitations which she describes as brief fluttering sensation usually rest.  No sustained palpitations.  Blood pressures tender run less than 140/90 mmHg at home.  History of whitecoat hypertension.  Maintaining an active lifestyle.  Gardening without difficulty.  Walks frequently.  Compliant with medical therapy.    PHYSICAL EXAMINATION:  There were no vitals filed for this visit.  General Appearance:    Alert, cooperative, no distress, appears stated age   Head:    Normocephalic, without obvious abnormality, atraumatic   Eyes:    conjunctiva/corneas clear   Nose:   Nares normal, septum midline, mucosa normal, no drainage   Throat:   Lips, teeth and gums normal   Neck:   Supple, symmetrical, trachea midline, no carotid    bruit or JVD   Lungs:     Clear to auscultation bilaterally, respirations unlabored   Chest Wall:    No  tenderness or deformity    Heart:    Regular rate and rhythm, S1 and S2 normal, 2/6 early peaking systolic murmur right upper sternal border, rub   or gallop, normal carotid impulse bilaterally without bruit.   Abdomen:     Soft, non-tender   Extremities:   Extremities normal, atraumatic, no cyanosis or edema   Pulses:   2+ and symmetric all extremities   Skin:   Skin color, texture, turgor normal, no rashes or lesions       Diagnostic Data:    ECG 12 Lead  Date/Time: 8/29/2018 1:39 PM  Performed by: JAVIER KINGSTON III  Authorized by: JAVIER KINGSTON III   Comparison: compared with previous ECG   Similar to previous ECG  Rhythm: sinus rhythm  Clinical impression: normal ECG          No results found for: CHLPL, TRIG, HDL, LDLDIRECT  Lab Results   Component Value Date    GLUCOSE 93 01/05/2017    BUN 10 01/05/2017    CREATININE 0.70 01/05/2017     01/05/2017    K 3.9 01/05/2017     01/05/2017    CO2 27.0 01/05/2017     Lab Results   Component Value Date    HGBA1C 4.8 07/05/2015     Lab Results   Component Value Date    WBC 6.78 01/05/2017    HGB 12.9 01/05/2017    HCT 38.6 01/05/2017     01/05/2017       Allergies  Allergies   Allergen Reactions   • Albuterol      Unknown reactions   • Statins Myalgia   • Demerol [Meperidine] Itching   • Sulfa Antibiotics Itching       Current Medications    Current Outpatient Prescriptions:   •  aspirin 81 MG EC tablet, Take 81 mg by mouth Daily., Disp: , Rfl:   •  Famotidine (PEPCID PO), Take 25 mg by mouth Daily., Disp: , Rfl:   •  flecainide (TAMBOCOR) 50 MG tablet, Take 1 tablet by mouth 2 (Two) Times a Day., Disp: 180 tablet, Rfl: 3  •  folic acid (FOLVITE) 400 MCG tablet, Take 400 mcg by mouth Daily., Disp: , Rfl:   •  hydrochlorothiazide (HYDRODIURIL) 25 MG tablet, Take 1 tablet by mouth Daily., Disp: 90 tablet, Rfl: 3  •  HYDROcodone-acetaminophen (NORCO) 7.5-325 MG per tablet, Take 1-2 tablets by mouth Every 4 (Four) Hours As Needed for moderate pain (4-6)  (Pain)., Disp: 51 tablet, Rfl: 0  •  niacin 500 MG tablet, Take 1 tablet by mouth Every Night., Disp: 90 tablet, Rfl: 3  •  promethazine (PHENERGAN) 12.5 MG tablet, Take 1 tablet by mouth Every 8 (Eight) Hours As Needed for nausea or vomiting., Disp: 40 tablet, Rfl: 0          ROS  Review of Systems   Cardiovascular: Negative for chest pain, dyspnea on exertion and irregular heartbeat.   Respiratory: Positive for shortness of breath. Negative for cough.    Genitourinary: Positive for frequency.       SOCIAL HX  Social History     Social History   • Marital status:      Spouse name: N/A   • Number of children: N/A   • Years of education: N/A     Occupational History   • Not on file.     Social History Main Topics   • Smoking status: Never Smoker   • Smokeless tobacco: Never Used   • Alcohol use Yes      Comment: occasionally   • Drug use: No   • Sexual activity: Defer     Other Topics Concern   • Not on file     Social History Narrative   • No narrative on file       FAMILY HX  Family History   Problem Relation Age of Onset   • Heart disease Mother    • Cancer Father    • Arthritis Father              Lewis Echols III, MD, FACC

## 2019-03-06 ENCOUNTER — OFFICE VISIT (OUTPATIENT)
Dept: CARDIOLOGY | Facility: CLINIC | Age: 84
End: 2019-03-06

## 2019-03-06 VITALS
SYSTOLIC BLOOD PRESSURE: 160 MMHG | DIASTOLIC BLOOD PRESSURE: 74 MMHG | BODY MASS INDEX: 25.74 KG/M2 | WEIGHT: 164 LBS | HEART RATE: 71 BPM | OXYGEN SATURATION: 96 % | HEIGHT: 67 IN

## 2019-03-06 DIAGNOSIS — E78.00 HYPERCHOLESTEROLEMIA: ICD-10-CM

## 2019-03-06 DIAGNOSIS — I49.1 ATRIAL CONTRACTIONS, PREMATURE: Primary | ICD-10-CM

## 2019-03-06 DIAGNOSIS — I10 BENIGN HYPERTENSION: ICD-10-CM

## 2019-03-06 PROCEDURE — 93000 ELECTROCARDIOGRAM COMPLETE: CPT | Performed by: INTERNAL MEDICINE

## 2019-03-06 PROCEDURE — 99214 OFFICE O/P EST MOD 30 MIN: CPT | Performed by: INTERNAL MEDICINE

## 2019-03-06 RX ORDER — HYDROCHLOROTHIAZIDE 25 MG/1
25 TABLET ORAL DAILY
Qty: 90 TABLET | Refills: 3 | Status: SHIPPED | OUTPATIENT
Start: 2019-03-06 | End: 2019-12-16 | Stop reason: SDUPTHER

## 2019-03-06 RX ORDER — FLECAINIDE ACETATE 50 MG/1
50 TABLET ORAL EVERY 12 HOURS SCHEDULED
Qty: 180 TABLET | Refills: 3 | Status: SHIPPED | OUTPATIENT
Start: 2019-03-06 | End: 2019-12-16 | Stop reason: SDUPTHER

## 2019-03-06 RX ORDER — AMLODIPINE BESYLATE 5 MG/1
5 TABLET ORAL DAILY
Qty: 90 TABLET | Refills: 1 | Status: SHIPPED | OUTPATIENT
Start: 2019-03-06 | End: 2019-12-16 | Stop reason: SDUPTHER

## 2019-03-06 NOTE — PROGRESS NOTES
[facility] at [facility]  Office visit  Yuni Mclain  1935  [phone]    VISIT DATE:  08/23/2017    PCP: Aristides Taylor MD  [address] [place] [address] ATTN: [place] [address]    CC:  Chief Complaint   Patient presents with   • Atrial contractions, premature       PROBLEM LIST:  1. Premature atrial contractions and premature ventricular contractions:  a. Recent Zio monitoring confirming PACs, no arrhythmias, no pauses.  2. Hypercholesterolemia.  3. Benign hypertension.  4. Chest pain:  a. Mild to moderate coronary artery disease by catheterization, 2003, medically managed.  b. Recurrent episode since November using nitroglycerin x1.  5. Mild mitral regurgitation by echocardiography.      ASSESSMENT:   Diagnosis Plan   1. Atrial contractions, premature     2. Hypercholesterolemia     3. Benign hypertension         PLAN:  -Premature atrial contractions: Continue flecainide 50 mg by mouth twice a day    -Hypertension: Goal less than 140/90 mmHg, ideally less than 130/80 mmHg.  Currently running in the 160/70-80 mmHg range.  Left arm blood pressure is significantly higher than right arm blood pressure, difference of 40 mmHg.  Suspect potential right upper extremity peripheral vascular disease which is currently clinically asymptomatic.  Continue hydrochlorothiazide 25 mg by mouth daily.  Adding amlodipine 5 mg p.o. at bedtime.  Patient will follow up with her primary care physician in 4-6 weeks to assess clinical response.    -Dyslipidemia: Continue niacin 500 mg by mouth daily.  Recent LDL of 160, goal less than 100, will likely need to rechallenge with a statin once her blood pressure is well controlled.    Subjective  Reports only rare episodes of palpitations which she describes as brief fluttering sensation usually rest.  No sustained palpitations.  Systolic blood pressures in her left arm are consistently running in the 160-165 mmHg range.  Right arm blood pressures are  "consistently lower, there is a 40mmHg difference today..  History of whitecoat hypertension.  Maintaining an active lifestyle.   Compliant with medical therapy.  Previously developed myalgias on statin therapy about 15 years ago.  Reviewed most recent fasting lipid panel.  She denies right upper extremity fatigue or weakness with use.    PHYSICAL EXAMINATION:  Vitals:    03/06/19 1315 03/06/19 1319   BP: 120/76 160/74   BP Location: Right arm Left arm   Patient Position: Sitting Sitting   Pulse: 71    SpO2: 96%    Weight: 74.4 kg (164 lb)    Height: 168.9 cm (66.5\")      General Appearance:    Alert, cooperative, no distress, appears stated age   Head:    Normocephalic, without obvious abnormality, atraumatic   Eyes:    conjunctiva/corneas clear   Nose:   Nares normal, septum midline, mucosa normal, no drainage   Throat:   Lips, teeth and gums normal   Neck:   Supple, symmetrical, trachea midline, no carotid    bruit or JVD   Lungs:     Clear to auscultation bilaterally, respirations unlabored   Chest Wall:    No tenderness or deformity, I do not appreciate a bruit over the right upper chest.    Heart:    Regular rate and rhythm, S1 and S2 normal, 2/6 early peaking systolic murmur right upper sternal border, rub   or gallop, normal carotid impulse bilaterally without bruit.   Abdomen:     Soft, non-tender   Extremities:   Extremities normal, atraumatic, no cyanosis or edema   Pulses:   2+ and symmetric all extremities   Skin:   Skin color, texture, turgor normal, no rashes or lesions       Diagnostic Data:    ECG 12 Lead  Date/Time: 3/6/2019 1:26 PM  Performed by: Lewis Echols III, MD  Authorized by: Lewis Echols III, MD   Comparison: compared with previous ECG   Similar to previous ECG  Rhythm: sinus rhythm    Clinical impression: normal ECG          No results found for: CHLPL, TRIG, HDL, LDLDIRECT  Lab Results   Component Value Date    GLUCOSE 93 01/05/2017    BUN 10 01/05/2017    CREATININE 0.70 01/05/2017    "  01/05/2017    K 3.9 01/05/2017     01/05/2017    CO2 27.0 01/05/2017     Lab Results   Component Value Date    HGBA1C 4.8 07/05/2015     Lab Results   Component Value Date    WBC 6.78 01/05/2017    HGB 12.9 01/05/2017    HCT 38.6 01/05/2017     01/05/2017       Allergies  Allergies   Allergen Reactions   • Albuterol      Unknown reactions   • Statins Myalgia   • Demerol [Meperidine] Itching   • Sulfa Antibiotics Itching       Current Medications    Current Outpatient Medications:   •  acetaminophen (TYLENOL) 325 MG tablet, Take 650 mg by mouth Every 6 (Six) Hours As Needed for Mild Pain ., Disp: , Rfl:   •  aspirin 81 MG EC tablet, Take 81 mg by mouth Daily., Disp: , Rfl:   •  Famotidine (PEPCID PO), Take 25 mg by mouth As Needed., Disp: , Rfl:   •  flecainide (TAMBOCOR) 50 MG tablet, Take 1 tablet by mouth Every 12 (Twelve) Hours., Disp: 180 tablet, Rfl: 3  •  folic acid (FOLVITE) 400 MCG tablet, Take 400 mcg by mouth Daily., Disp: , Rfl:   •  hydrochlorothiazide (HYDRODIURIL) 25 MG tablet, Take 1 tablet by mouth Daily., Disp: 90 tablet, Rfl: 3  •  niacin 500 MG tablet, Take 1 tablet by mouth Every Night., Disp: 90 tablet, Rfl: 3  •  amLODIPine (NORVASC) 5 MG tablet, Take 1 tablet by mouth Daily., Disp: 90 tablet, Rfl: 1          ROS  Review of Systems   Cardiovascular: Positive for irregular heartbeat and palpitations. Negative for chest pain and dyspnea on exertion.   Respiratory: Negative for cough.    Genitourinary: Positive for frequency.       SOCIAL HX  Social History     Socioeconomic History   • Marital status:      Spouse name: Not on file   • Number of children: Not on file   • Years of education: Not on file   • Highest education level: Not on file   Social Needs   • Financial resource strain: Not on file   • Food insecurity - worry: Not on file   • Food insecurity - inability: Not on file   • Transportation needs - medical: Not on file   • Transportation needs - non-medical:  Not on file   Occupational History   • Not on file   Tobacco Use   • Smoking status: Never Smoker   • Smokeless tobacco: Never Used   Substance and Sexual Activity   • Alcohol use: Yes     Comment: occasionally   • Drug use: No   • Sexual activity: Defer   Other Topics Concern   • Not on file   Social History Narrative   • Not on file       FAMILY HX  Family History   Problem Relation Age of Onset   • Heart disease Mother    • Cancer Father    • Arthritis Father              Lewis Echols III, MD, FACC

## 2019-06-10 ENCOUNTER — OFFICE VISIT (OUTPATIENT)
Dept: CARDIOLOGY | Facility: CLINIC | Age: 84
End: 2019-06-10

## 2019-06-10 VITALS
DIASTOLIC BLOOD PRESSURE: 70 MMHG | HEIGHT: 66 IN | WEIGHT: 161 LBS | HEART RATE: 68 BPM | SYSTOLIC BLOOD PRESSURE: 160 MMHG | BODY MASS INDEX: 25.88 KG/M2

## 2019-06-10 DIAGNOSIS — E78.00 HYPERCHOLESTEROLEMIA: ICD-10-CM

## 2019-06-10 DIAGNOSIS — I49.1 ATRIAL CONTRACTIONS, PREMATURE: Primary | ICD-10-CM

## 2019-06-10 DIAGNOSIS — I10 BENIGN HYPERTENSION: ICD-10-CM

## 2019-06-10 PROCEDURE — 99214 OFFICE O/P EST MOD 30 MIN: CPT | Performed by: INTERNAL MEDICINE

## 2019-06-10 PROCEDURE — 93000 ELECTROCARDIOGRAM COMPLETE: CPT | Performed by: INTERNAL MEDICINE

## 2019-06-10 RX ORDER — MELATONIN
5000 DAILY
COMMUNITY
End: 2023-03-01

## 2019-06-10 NOTE — PROGRESS NOTES
"[facility]  Office visit  Yuni Mclain  1935  [phone]    VISIT DATE:  6/10/2019      PCP: Markell Cho MD  [address] [place] [address]    CC:  Chief Complaint   Patient presents with   • premature atrial contractions       PROBLEM LIST:  1. Premature atrial contractions and premature ventricular contractions:  a. Recent Zio monitoring confirming PACs, no arrhythmias, no pauses.  2. Hypercholesterolemia.  3. Benign hypertension.  4. Chest pain:  a. Mild to moderate coronary artery disease by catheterization, 2003, medically managed.  b. Recurrent episode since November using nitroglycerin x1.  5. Mild mitral regurgitation by echocardiography.      ASSESSMENT:   Diagnosis Plan   1. Atrial contractions, premature     2. Hypercholesterolemia     3. Benign hypertension         PLAN:  -Premature atrial contractions: Continue flecainide 50 mg by mouth twice a day    -Hypertension: Goal less than 140/90 mmHg, ideally less than 130/80 mmHg.  Currently with reasonable control.  Continue current medical therapy.    -Dyslipidemia: Continue niacin 500 mg by mouth daily.  Recent LDL of 160, goal less than 100, previously intolerant to multiple statins.  Continue dietary modifications in the setting of primary prevention.    Subjective  History of whitecoat hypertension.  Blood pressures predominantly running less than 145/85 mmHg.  She is compliant with medical therapy.  Intermittent brief palpitations which she can sense at the base of her neck.  No associated symptoms.  No known triggers.  She has started taking CBD oil which is improved her arthritis pain.  Compliant with medical therapy.    PHYSICAL EXAMINATION:  Vitals:    06/10/19 1400   Pulse: 68   Weight: 73 kg (161 lb)   Height: 167.6 cm (66\")     General Appearance:    Alert, cooperative, no distress, appears stated age   Head:    Normocephalic, without obvious abnormality, atraumatic   Eyes:    " conjunctiva/corneas clear   Nose:   Nares normal, septum midline, mucosa normal, no drainage   Throat:   Lips, teeth and gums normal   Neck:   Supple, symmetrical, trachea midline, no carotid    bruit or JVD   Lungs:     Clear to auscultation bilaterally, respirations unlabored   Chest Wall:    No tenderness or deformity, I do not appreciate a bruit over the right upper chest.    Heart:    Regular rate and rhythm, S1 and S2 normal, 2/6 early peaking systolic murmur right upper sternal border, rub   or gallop, normal carotid impulse bilaterally without bruit.   Abdomen:     Soft, non-tender   Extremities:   Extremities normal, atraumatic, no cyanosis or edema   Pulses:   2+ and symmetric all extremities   Skin:   Skin color, texture, turgor normal, no rashes or lesions       Diagnostic Data:    ECG 12 Lead  Date/Time: 6/10/2019 2:07 PM  Performed by: Lewis Echols III, MD  Authorized by: Lewis Echols III, MD   Comparison: compared with previous ECG   Similar to previous ECG  Rhythm: sinus rhythm    Clinical impression: normal ECG          No results found for: CHLPL, TRIG, HDL, LDLDIRECT  Lab Results   Component Value Date    GLUCOSE 93 01/05/2017    BUN 10 01/05/2017    CREATININE 0.70 01/05/2017     01/05/2017    K 3.9 01/05/2017     01/05/2017    CO2 27.0 01/05/2017     Lab Results   Component Value Date    HGBA1C 4.8 07/05/2015     Lab Results   Component Value Date    WBC 6.78 01/05/2017    HGB 12.9 01/05/2017    HCT 38.6 01/05/2017     01/05/2017       Allergies  Allergies   Allergen Reactions   • Albuterol      Unknown reactions   • Statins Myalgia   • Demerol [Meperidine] Itching   • Sulfa Antibiotics Itching       Current Medications    Current Outpatient Medications:   •  amLODIPine (NORVASC) 5 MG tablet, Take 1 tablet by mouth Daily., Disp: 90 tablet, Rfl: 1  •  aspirin 81 MG EC tablet, Take 81 mg by mouth Daily., Disp: , Rfl:   •  Famotidine (PEPCID PO), Take 25 mg by mouth As Needed.,  Disp: , Rfl:   •  flecainide (TAMBOCOR) 50 MG tablet, Take 1 tablet by mouth Every 12 (Twelve) Hours., Disp: 180 tablet, Rfl: 3  •  folic acid (FOLVITE) 400 MCG tablet, Take 400 mcg by mouth Daily., Disp: , Rfl:   •  hydrochlorothiazide (HYDRODIURIL) 25 MG tablet, Take 1 tablet by mouth Daily., Disp: 90 tablet, Rfl: 3  •  Ibuprofen (IBU PO), Take  by mouth As Needed., Disp: , Rfl:   •  niacin 500 MG tablet, Take 1 tablet by mouth Every Night., Disp: 90 tablet, Rfl: 3          ROS  Review of Systems   Cardiovascular: Positive for irregular heartbeat and palpitations. Negative for chest pain and dyspnea on exertion.   Respiratory: Negative for cough.    Genitourinary: Positive for frequency.       SOCIAL HX  Social History     Socioeconomic History   • Marital status:      Spouse name: Not on file   • Number of children: Not on file   • Years of education: Not on file   • Highest education level: Not on file   Tobacco Use   • Smoking status: Never Smoker   • Smokeless tobacco: Never Used   Substance and Sexual Activity   • Alcohol use: Yes     Comment: occasionally   • Drug use: No   • Sexual activity: Defer       FAMILY HX  Family History   Problem Relation Age of Onset   • Heart disease Mother    • Cancer Father    • Arthritis Father              Lewis Echols III, MD, FACC

## 2019-07-10 ENCOUNTER — HOSPITAL ENCOUNTER (OUTPATIENT)
Dept: CARDIOLOGY | Facility: HOSPITAL | Age: 84
Discharge: HOME | End: 2019-07-10
Admitting: INTERNAL MEDICINE

## 2019-07-10 ENCOUNTER — TELEPHONE (OUTPATIENT)
Dept: CARDIOLOGY | Facility: CLINIC | Age: 84
End: 2019-07-10

## 2019-07-10 VITALS — BODY MASS INDEX: 25.88 KG/M2 | WEIGHT: 161 LBS | HEIGHT: 66 IN

## 2019-07-10 DIAGNOSIS — I49.1 ATRIAL CONTRACTIONS, PREMATURE: ICD-10-CM

## 2019-07-10 LAB
BH CV ECHO MEAS - AO ROOT AREA (BSA CORRECTED): 1.8
BH CV ECHO MEAS - AO ROOT AREA: 8.6 CM^2
BH CV ECHO MEAS - AO ROOT DIAM: 3.3 CM
BH CV ECHO MEAS - BSA(HAYCOCK): 1.9 M^2
BH CV ECHO MEAS - BSA: 1.8 M^2
BH CV ECHO MEAS - BZI_BMI: 26 KILOGRAMS/M^2
BH CV ECHO MEAS - BZI_METRIC_HEIGHT: 167.6 CM
BH CV ECHO MEAS - BZI_METRIC_WEIGHT: 73 KG
BH CV ECHO MEAS - EDV(CUBED): 107.2 ML
BH CV ECHO MEAS - EDV(MOD-SP2): 53 ML
BH CV ECHO MEAS - EDV(MOD-SP4): 52 ML
BH CV ECHO MEAS - EDV(TEICH): 104.9 ML
BH CV ECHO MEAS - EF(CUBED): 71.4 %
BH CV ECHO MEAS - EF(MOD-BP): 79 %
BH CV ECHO MEAS - EF(MOD-SP2): 81.1 %
BH CV ECHO MEAS - EF(MOD-SP4): 71.2 %
BH CV ECHO MEAS - EF(TEICH): 63 %
BH CV ECHO MEAS - ESV(CUBED): 30.7 ML
BH CV ECHO MEAS - ESV(MOD-SP2): 10 ML
BH CV ECHO MEAS - ESV(MOD-SP4): 15 ML
BH CV ECHO MEAS - ESV(TEICH): 38.8 ML
BH CV ECHO MEAS - FS: 34.1 %
BH CV ECHO MEAS - IVS/LVPW: 0.96
BH CV ECHO MEAS - IVSD: 1 CM
BH CV ECHO MEAS - LA DIMENSION: 3.8 CM
BH CV ECHO MEAS - LA/AO: 1.2
BH CV ECHO MEAS - LAD MAJOR: 5 CM
BH CV ECHO MEAS - LAT PEAK E' VEL: 7.7 CM/SEC
BH CV ECHO MEAS - LATERAL E/E' RATIO: 10.2
BH CV ECHO MEAS - LV DIASTOLIC VOL/BSA (35-75): 28.5 ML/M^2
BH CV ECHO MEAS - LV MASS(C)D: 176.5 GRAMS
BH CV ECHO MEAS - LV MASS(C)DI: 96.8 GRAMS/M^2
BH CV ECHO MEAS - LV SYSTOLIC VOL/BSA (12-30): 8.2 ML/M^2
BH CV ECHO MEAS - LVIDD: 4.8 CM
BH CV ECHO MEAS - LVIDS: 3.1 CM
BH CV ECHO MEAS - LVLD AP2: 6.8 CM
BH CV ECHO MEAS - LVLD AP4: 6.1 CM
BH CV ECHO MEAS - LVLS AP2: 4.6 CM
BH CV ECHO MEAS - LVLS AP4: 4.6 CM
BH CV ECHO MEAS - LVPWD: 1.1 CM
BH CV ECHO MEAS - MED PEAK E' VEL: 6 CM/SEC
BH CV ECHO MEAS - MEDIAL E/E' RATIO: 13
BH CV ECHO MEAS - MV A MAX VEL: 101 CM/SEC
BH CV ECHO MEAS - MV E MAX VEL: 78.5 CM/SEC
BH CV ECHO MEAS - MV E/A: 0.78
BH CV ECHO MEAS - PA ACC SLOPE: 574 CM/SEC^2
BH CV ECHO MEAS - PA ACC TIME: 0.11 SEC
BH CV ECHO MEAS - PA PR(ACCEL): 30 MMHG
BH CV ECHO MEAS - RAP SYSTOLE: 3 MMHG
BH CV ECHO MEAS - RVSP: 24 MMHG
BH CV ECHO MEAS - SI(CUBED): 41.9 ML/M^2
BH CV ECHO MEAS - SI(MOD-SP2): 23.6 ML/M^2
BH CV ECHO MEAS - SI(MOD-SP4): 20.3 ML/M^2
BH CV ECHO MEAS - SI(TEICH): 36.2 ML/M^2
BH CV ECHO MEAS - SV(CUBED): 76.5 ML
BH CV ECHO MEAS - SV(MOD-SP2): 43 ML
BH CV ECHO MEAS - SV(MOD-SP4): 37 ML
BH CV ECHO MEAS - SV(TEICH): 66.1 ML
BH CV ECHO MEAS - TAPSE (>1.6): 2.2 CM2
BH CV ECHO MEAS - TR MAX PG: 21 MMHG
BH CV ECHO MEAS - TR MAX VEL: 227 CM/SEC
BH CV ECHO MEASUREMENTS AVERAGE E/E' RATIO: 11.46
BH CV VAS BP RIGHT ARM: NORMAL MMHG
BH CV XLRA - RV BASE: 3.5 CM
BH CV XLRA - RV LENGTH: 6 CM
BH CV XLRA - RV MID: 2.6 CM
BH CV XLRA - TDI S': 9.1 CM/SEC
LEFT ATRIUM VOLUME INDEX: 14.3 ML/M^2
LEFT ATRIUM VOLUME: 26 ML
MAXIMAL PREDICTED HEART RATE: 137 BPM
STRESS TARGET HR: 116 BPM

## 2019-07-10 PROCEDURE — 93306 TTE W/DOPPLER COMPLETE: CPT | Performed by: INTERNAL MEDICINE

## 2019-07-10 PROCEDURE — 93306 TTE W/DOPPLER COMPLETE: CPT

## 2019-07-10 NOTE — TELEPHONE ENCOUNTER
----- Message from Lewis Echols III, MD sent at 7/10/2019 12:53 PM EDT -----  Normal strength of heart muscle.

## 2019-12-16 ENCOUNTER — OFFICE VISIT (OUTPATIENT)
Dept: CARDIOLOGY | Facility: CLINIC | Age: 84
End: 2019-12-16

## 2019-12-16 VITALS
HEART RATE: 66 BPM | SYSTOLIC BLOOD PRESSURE: 140 MMHG | HEIGHT: 66 IN | WEIGHT: 157 LBS | BODY MASS INDEX: 25.23 KG/M2 | DIASTOLIC BLOOD PRESSURE: 66 MMHG

## 2019-12-16 DIAGNOSIS — I10 BENIGN HYPERTENSION: ICD-10-CM

## 2019-12-16 DIAGNOSIS — I49.1 ATRIAL CONTRACTIONS, PREMATURE: Primary | ICD-10-CM

## 2019-12-16 DIAGNOSIS — E78.00 HYPERCHOLESTEROLEMIA: ICD-10-CM

## 2019-12-16 PROCEDURE — 99214 OFFICE O/P EST MOD 30 MIN: CPT | Performed by: INTERNAL MEDICINE

## 2019-12-16 RX ORDER — FLECAINIDE ACETATE 50 MG/1
50 TABLET ORAL EVERY 12 HOURS SCHEDULED
Qty: 180 TABLET | Refills: 3 | Status: SHIPPED | OUTPATIENT
Start: 2019-12-16 | End: 2020-12-16 | Stop reason: SDUPTHER

## 2019-12-16 RX ORDER — HYDROCHLOROTHIAZIDE 25 MG/1
25 TABLET ORAL DAILY
Qty: 90 TABLET | Refills: 3 | Status: SHIPPED | OUTPATIENT
Start: 2019-12-16 | End: 2020-12-16 | Stop reason: SDUPTHER

## 2019-12-16 RX ORDER — AMLODIPINE BESYLATE 5 MG/1
5 TABLET ORAL DAILY
Qty: 90 TABLET | Refills: 3 | Status: SHIPPED | OUTPATIENT
Start: 2019-12-16 | End: 2020-12-16 | Stop reason: SDUPTHER

## 2020-12-16 ENCOUNTER — OFFICE VISIT (OUTPATIENT)
Dept: CARDIOLOGY | Facility: CLINIC | Age: 85
End: 2020-12-16

## 2020-12-16 VITALS
OXYGEN SATURATION: 96 % | HEART RATE: 76 BPM | HEIGHT: 66 IN | DIASTOLIC BLOOD PRESSURE: 74 MMHG | SYSTOLIC BLOOD PRESSURE: 128 MMHG | WEIGHT: 159 LBS | BODY MASS INDEX: 25.55 KG/M2

## 2020-12-16 DIAGNOSIS — E78.00 HYPERCHOLESTEROLEMIA: ICD-10-CM

## 2020-12-16 DIAGNOSIS — I34.0 NONRHEUMATIC MITRAL VALVE REGURGITATION: ICD-10-CM

## 2020-12-16 DIAGNOSIS — I10 BENIGN HYPERTENSION: ICD-10-CM

## 2020-12-16 DIAGNOSIS — I49.1 ATRIAL CONTRACTIONS, PREMATURE: Primary | ICD-10-CM

## 2020-12-16 PROCEDURE — 93000 ELECTROCARDIOGRAM COMPLETE: CPT | Performed by: INTERNAL MEDICINE

## 2020-12-16 PROCEDURE — 99214 OFFICE O/P EST MOD 30 MIN: CPT | Performed by: INTERNAL MEDICINE

## 2020-12-16 RX ORDER — AMLODIPINE BESYLATE 5 MG/1
5 TABLET ORAL DAILY
Qty: 90 TABLET | Refills: 3 | Status: SHIPPED | OUTPATIENT
Start: 2020-12-16 | End: 2021-12-21

## 2020-12-16 RX ORDER — FLECAINIDE ACETATE 50 MG/1
50 TABLET ORAL EVERY 12 HOURS SCHEDULED
Qty: 180 TABLET | Refills: 3 | Status: SHIPPED | OUTPATIENT
Start: 2020-12-16 | End: 2021-03-26 | Stop reason: HOSPADM

## 2020-12-16 RX ORDER — HYDROCHLOROTHIAZIDE 25 MG/1
25 TABLET ORAL DAILY
Qty: 90 TABLET | Refills: 3 | Status: SHIPPED | OUTPATIENT
Start: 2020-12-16 | End: 2022-02-02

## 2020-12-16 NOTE — PROGRESS NOTES
[facility]  Office visit  Yuni Mclain  1935  [phone]    VISIT DATE:  12/16/2020      PCP: Markell Cho MD  [address] [place], [place] [id number]    CC:  Chief Complaint   Patient presents with   • Atrial contractions, premature       PROBLEM LIST:  1. Premature atrial contractions and premature ventricular contractions:  a. Recent Zio monitoring confirming PACs, no arrhythmias, no pauses.  2. Hypercholesterolemia.  3. Benign hypertension.  4. Chest pain:  a. Mild to moderate coronary artery disease by catheterization, 2003, medically managed.  b. Recurrent episode since November using nitroglycerin x1.  5. Mild mitral regurgitation by echocardiography.      Previous cardiac studies and procedures:  July 2019 echo  · Estimated EF appears to be in the range of 66 - 70%.  · Left ventricular wall thickness is consistent with hypertrophy. Sigmoid-shaped ventricular septum is present.  · Left ventricular diastolic dysfunction (grade I a) consistent with impaired relaxation.    ASSESSMENT:   Diagnosis Plan   1. Atrial contractions, premature     2. Benign hypertension     3. Hypercholesterolemia     4. Nonrheumatic mitral valve regurgitation         PLAN:  -Premature atrial contractions: Continue flecainide 50 mg by mouth twice a day.  Will arrange for ambulatory ECG monitor if symptoms increase in frequency or severity.    -Hypertension: Goal less than 140/90 mmHg, ideally less than 130/80 mmHg.  Currently with reasonable control.  Continue current medical therapy.    -Dyslipidemia: Continue niacin 500 mg by mouth daily.  Continue dietary modifications.  Elevated LDL which I do not think warrants further pharmacologic intervention at her age in the setting of primary prevention.    -Instructed to decrease aspirin to every other day.    Subjective  History of whitecoat hypertension.  Blood pressures predominantly running less than 140/90 mmHg.  She is compliant with  "medical therapy.  Reporting increasing episodes of brief palpitations which she describes as a brief fluttering sensation in her chest only lasting a few moments.  Always at rest.  Usually occurring on a daily basis but currently not significant affecting quality of life.  Maintaining an active lifestyle without any limitations.  Reviewed recent laboratory evaluation.  She has noticed some easy bruising on her hands.    PHYSICAL EXAMINATION:  Vitals:    12/16/20 1328   BP: 128/74   BP Location: Left arm   Patient Position: Sitting   Pulse: 76   SpO2: 96%   Weight: 72.1 kg (159 lb)   Height: 167.6 cm (66\")     General Appearance:    Alert, cooperative, no distress, appears stated age   Head:    Normocephalic, without obvious abnormality, atraumatic   Eyes:    conjunctiva/corneas clear   Nose:   Nares normal, septum midline, mucosa normal, no drainage   Throat:   Lips, teeth and gums normal   Neck:   Supple, symmetrical, trachea midline, no carotid    bruit or JVD   Lungs:     Clear to auscultation bilaterally, respirations unlabored   Chest Wall:    No tenderness or deformity, I do not appreciate a bruit over the right upper chest.    Heart:    Regular rate and rhythm, S1 and S2 normal, 2/6 early peaking systolic murmur right upper sternal border, rub   or gallop, normal carotid impulse bilaterally without bruit.   Abdomen:     Soft, non-tender   Extremities:   Extremities normal, atraumatic, no cyanosis or edema   Pulses:   2+ and symmetric all extremities   Skin:   Skin color, texture, turgor normal, no rashes or lesions       Diagnostic Data:    ECG 12 Lead    Date/Time: 12/16/2020 1:47 PM  Performed by: Lewis Echols III, MD  Authorized by: Lewis Echols III, MD   Comparison: compared with previous ECG from 6/10/2019  Similar to previous ECG  Rhythm: sinus rhythm  Other findings: non-specific ST-T wave changes    Clinical impression: non-specific ECG          No results found for: CHLPL, TRIG, HDL, LDLDIRECT  Lab " Results   Component Value Date    GLUCOSE 93 2017    BUN 10 2017    CREATININE 0.70 2017     2017    K 3.9 2017     2017    CO2 27.0 2017     Lab Results   Component Value Date    HGBA1C 4.8 2015     Lab Results   Component Value Date    WBC 6.78 2017    HGB 12.9 2017    HCT 38.6 2017     2017       Allergies  Allergies   Allergen Reactions   • Albuterol Other (See Comments)     Unknown reactions   • Demerol [Meperidine] Itching   • Statins Myalgia       Current Medications    Current Outpatient Medications:   •  amLODIPine (NORVASC) 5 MG tablet, Take 1 tablet by mouth Daily., Disp: 90 tablet, Rfl: 3  •  aspirin 81 MG EC tablet, Take 81 mg by mouth Daily., Disp: , Rfl:   •  cholecalciferol (VITAMIN D3) 1000 units tablet, Take 5,000 Units by mouth Daily., Disp: , Rfl:   •  flecainide (TAMBOCOR) 50 MG tablet, Take 1 tablet by mouth Every 12 (Twelve) Hours., Disp: 180 tablet, Rfl: 3  •  folic acid (FOLVITE) 400 MCG tablet, Take 400 mcg by mouth Daily., Disp: , Rfl:   •  hydroCHLOROthiazide (HYDRODIURIL) 25 MG tablet, Take 1 tablet by mouth Daily., Disp: 90 tablet, Rfl: 3  •  niacin 500 MG tablet, Take 1 tablet by mouth Every Night., Disp: 90 tablet, Rfl: 3          ROS  Review of Systems   Cardiovascular: Positive for irregular heartbeat and palpitations. Negative for chest pain and dyspnea on exertion.   Respiratory: Positive for snoring. Negative for cough.    Genitourinary: Positive for frequency.       SOCIAL HX  Social History     Socioeconomic History   • Marital status:      Spouse name: Not on file   • Number of children: Not on file   • Years of education: Not on file   • Highest education level: Not on file   Tobacco Use   • Smoking status: Former Smoker     Types: Cigarettes     Quit date: 1969     Years since quittin.0   • Smokeless tobacco: Never Used   Substance and Sexual Activity   • Alcohol use:  Yes     Comment: occasionally   • Drug use: No   • Sexual activity: Defer       FAMILY HX  Family History   Problem Relation Age of Onset   • Heart disease Mother    • Cancer Father    • Arthritis Father              Lewis Echols III, MD, FACC

## 2021-03-15 ENCOUNTER — TELEPHONE (OUTPATIENT)
Dept: CARDIOLOGY | Facility: CLINIC | Age: 86
End: 2021-03-15

## 2021-03-15 DIAGNOSIS — R00.2 HEART PALPITATIONS: ICD-10-CM

## 2021-03-15 DIAGNOSIS — I20.0 UNSTABLE ANGINA (HCC): Primary | ICD-10-CM

## 2021-03-15 NOTE — TELEPHONE ENCOUNTER
Had an episode of angina on 3/13/2021. Described it as severe mid-sternal pressure. Had nausea. Took 2 NTG sl and two 81 mg asa and had relief. Also,having daily palpitations.Requesting appt be moved up. Please advise.

## 2021-03-15 NOTE — TELEPHONE ENCOUNTER
Arrange for left heart catheterization for unstable angina and set her up with a 48-hour Holter monitor.

## 2021-03-15 NOTE — TELEPHONE ENCOUNTER
Called patient to inform her of message above from . No answer. Left voice message to call our office.

## 2021-03-16 PROBLEM — I20.0 UNSTABLE ANGINA: Status: ACTIVE | Noted: 2021-03-16

## 2021-03-22 ENCOUNTER — PREP FOR SURGERY (OUTPATIENT)
Dept: OTHER | Facility: HOSPITAL | Age: 86
End: 2021-03-22

## 2021-03-22 DIAGNOSIS — I20.0 UNSTABLE ANGINA (HCC): Primary | ICD-10-CM

## 2021-03-22 RX ORDER — ONDANSETRON 2 MG/ML
4 INJECTION INTRAMUSCULAR; INTRAVENOUS EVERY 6 HOURS PRN
Status: CANCELLED | OUTPATIENT
Start: 2021-03-22

## 2021-03-22 RX ORDER — LIDOCAINE HYDROCHLORIDE 10 MG/ML
0.1 INJECTION, SOLUTION EPIDURAL; INFILTRATION; INTRACAUDAL; PERINEURAL ONCE AS NEEDED
Status: CANCELLED | OUTPATIENT
Start: 2021-03-22

## 2021-03-22 RX ORDER — NITROGLYCERIN 0.4 MG/1
0.4 TABLET SUBLINGUAL
Status: CANCELLED | OUTPATIENT
Start: 2021-03-22

## 2021-03-22 RX ORDER — SODIUM CHLORIDE 0.9 % (FLUSH) 0.9 %
3 SYRINGE (ML) INJECTION EVERY 12 HOURS SCHEDULED
Status: CANCELLED | OUTPATIENT
Start: 2021-03-22

## 2021-03-22 RX ORDER — ASPIRIN 81 MG/1
81 TABLET ORAL DAILY
Status: CANCELLED | OUTPATIENT
Start: 2021-03-23

## 2021-03-22 RX ORDER — SODIUM CHLORIDE 0.9 % (FLUSH) 0.9 %
10 SYRINGE (ML) INJECTION AS NEEDED
Status: CANCELLED | OUTPATIENT
Start: 2021-03-22

## 2021-03-22 RX ORDER — ASPIRIN 81 MG/1
324 TABLET, CHEWABLE ORAL ONCE
Status: CANCELLED | OUTPATIENT
Start: 2021-03-22 | End: 2021-03-22

## 2021-03-23 ENCOUNTER — APPOINTMENT (OUTPATIENT)
Dept: PREADMISSION TESTING | Facility: HOSPITAL | Age: 86
End: 2021-03-23

## 2021-03-23 PROCEDURE — U0004 COV-19 TEST NON-CDC HGH THRU: HCPCS

## 2021-03-23 PROCEDURE — U0005 INFEC AGEN DETEC AMPLI PROBE: HCPCS

## 2021-03-23 PROCEDURE — C9803 HOPD COVID-19 SPEC COLLECT: HCPCS

## 2021-03-24 LAB — SARS-COV-2 RNA NOSE QL NAA+PROBE: NOT DETECTED

## 2021-03-26 ENCOUNTER — HOSPITAL ENCOUNTER (OUTPATIENT)
Facility: HOSPITAL | Age: 86
Discharge: HOME | End: 2021-03-26
Attending: INTERNAL MEDICINE | Admitting: INTERNAL MEDICINE

## 2021-03-26 VITALS
WEIGHT: 160.8 LBS | DIASTOLIC BLOOD PRESSURE: 67 MMHG | SYSTOLIC BLOOD PRESSURE: 129 MMHG | HEART RATE: 58 BPM | TEMPERATURE: 97.3 F | BODY MASS INDEX: 25.84 KG/M2 | OXYGEN SATURATION: 94 % | HEIGHT: 66 IN | RESPIRATION RATE: 16 BRPM

## 2021-03-26 DIAGNOSIS — I20.0 UNSTABLE ANGINA (HCC): ICD-10-CM

## 2021-03-26 LAB
ANION GAP SERPL CALCULATED.3IONS-SCNC: 8 MMOL/L (ref 5–15)
BUN SERPL-MCNC: 16 MG/DL (ref 8–23)
BUN/CREAT SERPL: 20.5 (ref 7–25)
CALCIUM SPEC-SCNC: 9.4 MG/DL (ref 8.6–10.5)
CHLORIDE SERPL-SCNC: 102 MMOL/L (ref 98–107)
CHOLEST SERPL-MCNC: 266 MG/DL (ref 0–200)
CO2 SERPL-SCNC: 29 MMOL/L (ref 22–29)
CREAT BLDA-MCNC: 0.7 MG/DL (ref 0.6–1.3)
CREAT SERPL-MCNC: 0.78 MG/DL (ref 0.57–1)
DEPRECATED RDW RBC AUTO: 44.1 FL (ref 37–54)
ERYTHROCYTE [DISTWIDTH] IN BLOOD BY AUTOMATED COUNT: 12.7 % (ref 12.3–15.4)
GFR SERPL CREATININE-BSD FRML MDRD: 70 ML/MIN/1.73
GLUCOSE SERPL-MCNC: 100 MG/DL (ref 65–99)
HCT VFR BLD AUTO: 40.2 % (ref 34–46.6)
HDLC SERPL-MCNC: 78 MG/DL (ref 40–60)
HGB BLD-MCNC: 13 G/DL (ref 12–15.9)
LDLC SERPL CALC-MCNC: 173 MG/DL (ref 0–100)
LDLC/HDLC SERPL: 2.19 {RATIO}
MCH RBC QN AUTO: 30.7 PG (ref 26.6–33)
MCHC RBC AUTO-ENTMCNC: 32.3 G/DL (ref 31.5–35.7)
MCV RBC AUTO: 95 FL (ref 79–97)
PLATELET # BLD AUTO: 213 10*3/MM3 (ref 140–450)
PMV BLD AUTO: 9.8 FL (ref 6–12)
POTASSIUM SERPL-SCNC: 3.7 MMOL/L (ref 3.5–5.2)
RBC # BLD AUTO: 4.23 10*6/MM3 (ref 3.77–5.28)
SODIUM SERPL-SCNC: 139 MMOL/L (ref 136–145)
TRIGL SERPL-MCNC: 87 MG/DL (ref 0–150)
VLDLC SERPL-MCNC: 15 MG/DL (ref 5–40)
WBC # BLD AUTO: 5.58 10*3/MM3 (ref 3.4–10.8)

## 2021-03-26 PROCEDURE — C1769 GUIDE WIRE: HCPCS | Performed by: INTERNAL MEDICINE

## 2021-03-26 PROCEDURE — 82565 ASSAY OF CREATININE: CPT

## 2021-03-26 PROCEDURE — 25010000002 MIDAZOLAM PER 1 MG: Performed by: INTERNAL MEDICINE

## 2021-03-26 PROCEDURE — 85027 COMPLETE CBC AUTOMATED: CPT | Performed by: NURSE PRACTITIONER

## 2021-03-26 PROCEDURE — 80048 BASIC METABOLIC PNL TOTAL CA: CPT | Performed by: NURSE PRACTITIONER

## 2021-03-26 PROCEDURE — 25010000002 FENTANYL CITRATE (PF) 100 MCG/2ML SOLUTION: Performed by: INTERNAL MEDICINE

## 2021-03-26 PROCEDURE — 93458 L HRT ARTERY/VENTRICLE ANGIO: CPT | Performed by: INTERNAL MEDICINE

## 2021-03-26 PROCEDURE — C1894 INTRO/SHEATH, NON-LASER: HCPCS | Performed by: INTERNAL MEDICINE

## 2021-03-26 PROCEDURE — 0 IOPAMIDOL PER 1 ML: Performed by: INTERNAL MEDICINE

## 2021-03-26 PROCEDURE — 80061 LIPID PANEL: CPT | Performed by: NURSE PRACTITIONER

## 2021-03-26 RX ORDER — AMLODIPINE BESYLATE 5 MG/1
5 TABLET ORAL DAILY
Status: DISCONTINUED | OUTPATIENT
Start: 2021-03-27 | End: 2021-03-26 | Stop reason: HOSPADM

## 2021-03-26 RX ORDER — EZETIMIBE 10 MG/1
10 TABLET ORAL DAILY
Qty: 90 TABLET | Refills: 3 | Status: SHIPPED | OUTPATIENT
Start: 2021-03-26 | End: 2022-02-16 | Stop reason: SDUPTHER

## 2021-03-26 RX ORDER — SODIUM CHLORIDE 0.9 % (FLUSH) 0.9 %
10 SYRINGE (ML) INJECTION AS NEEDED
Status: DISCONTINUED | OUTPATIENT
Start: 2021-03-26 | End: 2021-03-26 | Stop reason: HOSPADM

## 2021-03-26 RX ORDER — FLECAINIDE ACETATE 50 MG/1
50 TABLET ORAL EVERY 12 HOURS SCHEDULED
Status: DISCONTINUED | OUTPATIENT
Start: 2021-03-26 | End: 2021-03-26 | Stop reason: HOSPADM

## 2021-03-26 RX ORDER — LANOLIN ALCOHOL/MO/W.PET/CERES
400 CREAM (GRAM) TOPICAL DAILY
Status: DISCONTINUED | OUTPATIENT
Start: 2021-03-27 | End: 2021-03-26 | Stop reason: HOSPADM

## 2021-03-26 RX ORDER — HYDROCHLOROTHIAZIDE 25 MG/1
25 TABLET ORAL DAILY
Status: DISCONTINUED | OUTPATIENT
Start: 2021-03-27 | End: 2021-03-26 | Stop reason: HOSPADM

## 2021-03-26 RX ORDER — MIDAZOLAM HYDROCHLORIDE 1 MG/ML
INJECTION INTRAMUSCULAR; INTRAVENOUS AS NEEDED
Status: DISCONTINUED | OUTPATIENT
Start: 2021-03-26 | End: 2021-03-26 | Stop reason: HOSPADM

## 2021-03-26 RX ORDER — LIDOCAINE HYDROCHLORIDE 10 MG/ML
INJECTION, SOLUTION EPIDURAL; INFILTRATION; INTRACAUDAL; PERINEURAL AS NEEDED
Status: DISCONTINUED | OUTPATIENT
Start: 2021-03-26 | End: 2021-03-26 | Stop reason: HOSPADM

## 2021-03-26 RX ORDER — ONDANSETRON 2 MG/ML
4 INJECTION INTRAMUSCULAR; INTRAVENOUS EVERY 6 HOURS PRN
Status: DISCONTINUED | OUTPATIENT
Start: 2021-03-26 | End: 2021-03-26 | Stop reason: HOSPADM

## 2021-03-26 RX ORDER — MELATONIN
5000 DAILY
Status: DISCONTINUED | OUTPATIENT
Start: 2021-03-27 | End: 2021-03-26 | Stop reason: HOSPADM

## 2021-03-26 RX ORDER — FENTANYL CITRATE 50 UG/ML
INJECTION, SOLUTION INTRAMUSCULAR; INTRAVENOUS AS NEEDED
Status: DISCONTINUED | OUTPATIENT
Start: 2021-03-26 | End: 2021-03-26 | Stop reason: HOSPADM

## 2021-03-26 RX ORDER — ASPIRIN 81 MG/1
324 TABLET, CHEWABLE ORAL ONCE
Status: COMPLETED | OUTPATIENT
Start: 2021-03-26 | End: 2021-03-26

## 2021-03-26 RX ORDER — ACETAMINOPHEN 325 MG/1
650 TABLET ORAL EVERY 4 HOURS PRN
Status: DISCONTINUED | OUTPATIENT
Start: 2021-03-26 | End: 2021-03-26 | Stop reason: HOSPADM

## 2021-03-26 RX ORDER — ASPIRIN 81 MG/1
81 TABLET ORAL DAILY
Status: DISCONTINUED | OUTPATIENT
Start: 2021-03-26 | End: 2021-03-26 | Stop reason: SDUPTHER

## 2021-03-26 RX ORDER — ASPIRIN 81 MG/1
81 TABLET ORAL DAILY
Status: DISCONTINUED | OUTPATIENT
Start: 2021-03-27 | End: 2021-03-26 | Stop reason: HOSPADM

## 2021-03-26 RX ORDER — NITROGLYCERIN 0.4 MG/1
0.4 TABLET SUBLINGUAL
Status: DISCONTINUED | OUTPATIENT
Start: 2021-03-26 | End: 2021-03-26 | Stop reason: HOSPADM

## 2021-03-26 RX ORDER — SODIUM CHLORIDE 0.9 % (FLUSH) 0.9 %
3 SYRINGE (ML) INJECTION EVERY 12 HOURS SCHEDULED
Status: DISCONTINUED | OUTPATIENT
Start: 2021-03-26 | End: 2021-03-26 | Stop reason: HOSPADM

## 2021-03-26 RX ORDER — ISOSORBIDE MONONITRATE 30 MG/1
30 TABLET, EXTENDED RELEASE ORAL DAILY
Qty: 30 TABLET | Refills: 11 | Status: SHIPPED | OUTPATIENT
Start: 2021-03-26 | End: 2022-02-16 | Stop reason: SDUPTHER

## 2021-03-26 RX ORDER — LIDOCAINE HYDROCHLORIDE 10 MG/ML
0.1 INJECTION, SOLUTION EPIDURAL; INFILTRATION; INTRACAUDAL; PERINEURAL ONCE AS NEEDED
Status: DISCONTINUED | OUTPATIENT
Start: 2021-03-26 | End: 2021-03-26 | Stop reason: HOSPADM

## 2021-03-26 RX ADMIN — ASPIRIN 324 MG: 81 TABLET, CHEWABLE ORAL at 07:51

## 2021-04-06 ENCOUNTER — TELEPHONE (OUTPATIENT)
Dept: CARDIOLOGY | Facility: CLINIC | Age: 86
End: 2021-04-06

## 2021-04-06 NOTE — TELEPHONE ENCOUNTER
Notified of message above from . Stated has been having an irregular heart beat since her cardiac cath last week.Happens daily and frequently.Feels tired. Feels better when she is up and around working. Stated would call back if she felt like she needed to be seen. Please advise if any further instructions.

## 2021-04-06 NOTE — TELEPHONE ENCOUNTER
----- Message from Lewis Echols III, MD sent at 4/6/2021  3:16 PM EDT -----  No concerning heart rhythm issues noted on her monitor

## 2021-06-21 ENCOUNTER — OFFICE VISIT (OUTPATIENT)
Dept: CARDIOLOGY | Facility: CLINIC | Age: 86
End: 2021-06-21

## 2021-06-21 VITALS
DIASTOLIC BLOOD PRESSURE: 60 MMHG | HEIGHT: 67 IN | WEIGHT: 154 LBS | HEART RATE: 70 BPM | BODY MASS INDEX: 24.17 KG/M2 | OXYGEN SATURATION: 97 % | SYSTOLIC BLOOD PRESSURE: 124 MMHG

## 2021-06-21 DIAGNOSIS — I25.10 CORONARY ARTERY DISEASE INVOLVING NATIVE CORONARY ARTERY OF NATIVE HEART WITHOUT ANGINA PECTORIS: ICD-10-CM

## 2021-06-21 DIAGNOSIS — I10 BENIGN HYPERTENSION: Primary | ICD-10-CM

## 2021-06-21 DIAGNOSIS — E78.00 HYPERCHOLESTEROLEMIA: ICD-10-CM

## 2021-06-21 DIAGNOSIS — I49.1 ATRIAL CONTRACTIONS, PREMATURE: ICD-10-CM

## 2021-06-21 PROBLEM — R32 URINARY INCONTINENCE: Status: ACTIVE | Noted: 2021-06-21

## 2021-06-21 PROCEDURE — 99214 OFFICE O/P EST MOD 30 MIN: CPT | Performed by: INTERNAL MEDICINE

## 2021-06-21 NOTE — PROGRESS NOTES
[facility]  Office visit  Yuni Mclain  1935  [phone]    VISIT DATE:  6/21/2021      PCP: Markell Cho MD  [address] [place] [address]    CC:  Chief Complaint   Patient presents with   • Coronary Artery Disease       PROBLEM LIST:  1. Premature atrial contractions and premature ventricular contractions:  a. Recent Zio monitoring confirming PACs, no arrhythmias, no pauses.  2. Hypercholesterolemia.  3. Benign hypertension.  4. Chest pain:  a. Mild to moderate coronary artery disease by catheterization, 2003, medically managed.  b. Recurrent episode since November using nitroglycerin x1.  5. Mild mitral regurgitation by echocardiography.      Previous cardiac studies and procedures:  2003 approximately: PCI with stenting, data insufficient.    July 2019 echo  · Estimated EF appears to be in the range of 66 - 70%.  · Left ventricular wall thickness is consistent with hypertrophy. Sigmoid-shaped ventricular septum is present.  · Left ventricular diastolic dysfunction (grade I a) consistent with impaired relaxation.    March 2021   cardiac catheterization  · 60% proximal LAD, otherwise mild nonobstructive atherosclerosis.  · Patent mid left circumflex stent  · Normal LVEDP  · Normal LVEF  · No aortic stenosis  · No angiographically significant mitral regurgitation  48-hour Holter monitor: The predominant rhythm noted during the testing period was sinus rhythm. Premature atrial contractions occured rarely. Premature ventricular contractions did not appear during monitoring.    ASSESSMENT:   Diagnosis Plan   1. Benign hypertension     2. Atrial contractions, premature     3. Hypercholesterolemia     4. Coronary artery disease involving native coronary artery of native heart without angina pectoris         PLAN:  -Premature atrial contractions: Off class Ic agents due to history of CAD.  Increasing symptoms, starting sotalol 40 mg p.o. twice daily.  Repeat EKG  "in 48 hours..    -Hypertension: Goal less than 140/90 mmHg, ideally less than 130/80 mmHg.  Currently with reasonable control.  Continue current medical therapy.    -Dyslipidemia: Continue current medical therapy.    Coronary artery disease: Currently stable asymptomatic.  Remote PCI.  Continue current medical therapy.    Subjective  History of whitecoat hypertension.  Blood pressures predominantly running less than 140/90 mmHg.  She is compliant with medical therapy.  Has noted increasing episodes of brief palpitations, both at rest and with exertion.  Has had some increasing fatigue since her cardiac catheterization.  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  Systolic blood pressures ranging from 120 up to 140 mmHg.    PHYSICAL EXAMINATION:  Vitals:    06/21/21 1057   BP: 124/60   BP Location: Left arm   Patient Position: Sitting   Pulse: 70   SpO2: 97%   Weight: 69.9 kg (154 lb)   Height: 168.9 cm (66.5\")     General Appearance:    Alert, cooperative, no distress, appears stated age   Head:    Normocephalic, without obvious abnormality, atraumatic   Eyes:    conjunctiva/corneas clear   Nose:   Nares normal, septum midline, mucosa normal, no drainage   Throat:   Lips, teeth and gums normal   Neck:   Supple, symmetrical, trachea midline, no carotid    bruit or JVD   Lungs:     Clear to auscultation bilaterally, respirations unlabored   Chest Wall:    No tenderness or deformity, I do not appreciate a bruit over the right upper chest.    Heart:    Regular rate and rhythm, S1 and S2 normal, 2/6 early peaking systolic murmur right upper sternal border, rub   or gallop, normal carotid impulse bilaterally without bruit.   Abdomen:     Soft, non-tender   Extremities:   Extremities normal, atraumatic, no cyanosis or edema   Pulses:   2+ and symmetric all extremities   Skin:   Skin color, texture, turgor normal, no rashes or lesions       Diagnostic " Data:  Procedures  Lab Results   Component Value Date    TRIG 87 03/26/2021    HDL 78 (H) 03/26/2021     Lab Results   Component Value Date    GLUCOSE 100 (H) 03/26/2021    BUN 16 03/26/2021    CREATININE 0.70 03/26/2021     03/26/2021    K 3.7 03/26/2021     03/26/2021    CO2 29.0 03/26/2021     Lab Results   Component Value Date    HGBA1C 4.8 07/05/2015     Lab Results   Component Value Date    WBC 5.58 03/26/2021    HGB 13.0 03/26/2021    HCT 40.2 03/26/2021     03/26/2021       Allergies  Allergies   Allergen Reactions   • Albuterol Other (See Comments)     Unknown reactions   • Demerol [Meperidine] Itching   • Statins Myalgia       Current Medications    Current Outpatient Medications:   •  amLODIPine (NORVASC) 5 MG tablet, Take 1 tablet by mouth Daily., Disp: 90 tablet, Rfl: 3  •  aspirin 81 MG EC tablet, Take 81 mg by mouth Daily., Disp: , Rfl:   •  cholecalciferol (VITAMIN D3) 1000 units tablet, Take 5,000 Units by mouth Daily., Disp: , Rfl:   •  ezetimibe (ZETIA) 10 MG tablet, Take 1 tablet by mouth Daily., Disp: 90 tablet, Rfl: 3  •  folic acid (FOLVITE) 400 MCG tablet, Take 400 mcg by mouth Daily., Disp: , Rfl:   •  hydroCHLOROthiazide (HYDRODIURIL) 25 MG tablet, Take 1 tablet by mouth Daily., Disp: 90 tablet, Rfl: 3  •  isosorbide mononitrate (IMDUR) 30 MG 24 hr tablet, Take 1 tablet by mouth Daily., Disp: 30 tablet, Rfl: 11  •  niacin 500 MG tablet, Take 1 tablet by mouth Every Night., Disp: 90 tablet, Rfl: 3  •  Sotalol HCl AF 80 MG tablet, Take 0.5 tablets by mouth 2 (Two) Times a Day., Disp: 90 tablet, Rfl: 1          ROS  Review of Systems   Cardiovascular: Positive for irregular heartbeat and palpitations. Negative for chest pain and dyspnea on exertion.   Respiratory: Positive for snoring. Negative for cough.    Genitourinary: Positive for frequency.       SOCIAL HX  Social History     Socioeconomic History   • Marital status:      Spouse name: Not on file   • Number of  children: Not on file   • Years of education: Not on file   • Highest education level: Not on file   Tobacco Use   • Smoking status: Former Smoker     Types: Cigarettes     Quit date: 1969     Years since quittin.5   • Smokeless tobacco: Never Used   Substance and Sexual Activity   • Alcohol use: Yes     Comment: occasionally   • Drug use: No   • Sexual activity: Defer       FAMILY HX  Family History   Problem Relation Age of Onset   • Heart disease Mother    • Cancer Father    • Arthritis Father              Lewis Echols III, MD, FACC

## 2021-07-12 ENCOUNTER — HOSPITAL ENCOUNTER (OUTPATIENT)
Age: 86
End: 2021-07-12
Payer: MEDICARE

## 2021-07-12 DIAGNOSIS — R53.83: Primary | ICD-10-CM

## 2021-07-12 LAB
ALBUMIN LEVEL: 3.8 G/DL (ref 3.5–5)
ALBUMIN/GLOB SERPL: 1.4 {RATIO} (ref 1.1–1.8)
ALP ISO SERPL-ACNC: 62 U/L (ref 38–126)
ALT SERPLBLD-CCNC: 17 U/L (ref 12–78)
ANION GAP SERPL CALC-SCNC: 11.1 MEQ/L (ref 5–15)
AST SERPL QL: 34 U/L (ref 14–36)
BILIRUBIN,TOTAL: 0.6 MG/DL (ref 0.2–1.3)
BUN SERPL-MCNC: 10 MG/DL (ref 7–17)
CALCIUM SPEC-MCNC: 9.3 MG/DL (ref 8.4–10.2)
CHLORIDE SPEC-SCNC: 99 MMOL/L (ref 98–107)
CO2 SERPL-SCNC: 28 MMOL/L (ref 22–30)
CREAT BLD-SCNC: 0.6 MG/DL (ref 0.52–1.04)
ESTIMATED GLOMERULAR FILT RATE: 95 ML/MIN (ref 60–?)
GFR (AFRICAN AMERICAN): 115 ML/MIN (ref 60–?)
GLOBULIN SER CALC-MCNC: 2.8 G/DL (ref 1.3–3.2)
GLUCOSE: 98 MG/DL (ref 74–100)
HCT VFR BLD CALC: 35.2 % (ref 37–47)
HGB BLD-MCNC: 11.7 G/DL (ref 12.2–16.2)
MCHC RBC-ENTMCNC: 33.2 G/DL (ref 31.8–35.4)
MCV RBC: 92.2 FL (ref 81–99)
MEAN CORPUSCULAR HEMOGLOBIN: 30.6 PG (ref 27–31.2)
PLATELET # BLD: 344 K/MM3 (ref 142–424)
POTASSIUM: 5.1 MMOL/L (ref 3.5–5.1)
PROT SERPL-MCNC: 6.6 G/DL (ref 6.3–8.2)
RBC # BLD AUTO: 3.82 M/MM3 (ref 4.2–5.4)
SODIUM SPEC-SCNC: 133 MMOL/L (ref 136–145)
WBC # BLD AUTO: 9.7 K/MM3 (ref 4.8–10.8)

## 2021-07-12 PROCEDURE — 71046 X-RAY EXAM CHEST 2 VIEWS: CPT

## 2021-07-12 PROCEDURE — 80053 COMPREHEN METABOLIC PANEL: CPT

## 2021-07-12 PROCEDURE — 85025 COMPLETE CBC W/AUTO DIFF WBC: CPT

## 2021-07-12 PROCEDURE — 36415 COLL VENOUS BLD VENIPUNCTURE: CPT

## 2021-07-20 ENCOUNTER — HOSPITAL ENCOUNTER (OUTPATIENT)
Age: 86
End: 2021-07-20
Payer: MEDICARE

## 2021-07-20 DIAGNOSIS — J22: Primary | ICD-10-CM

## 2021-07-20 PROCEDURE — 71250 CT THORAX DX C-: CPT

## 2021-11-04 ENCOUNTER — OFFICE VISIT (OUTPATIENT)
Dept: ORTHOPEDIC SURGERY | Facility: CLINIC | Age: 86
End: 2021-11-04

## 2021-11-04 VITALS
SYSTOLIC BLOOD PRESSURE: 142 MMHG | BODY MASS INDEX: 23.09 KG/M2 | DIASTOLIC BLOOD PRESSURE: 86 MMHG | WEIGHT: 143.7 LBS | HEIGHT: 66 IN

## 2021-11-04 DIAGNOSIS — M54.16 RADICULOPATHY, LUMBAR REGION: ICD-10-CM

## 2021-11-04 DIAGNOSIS — M70.62 TROCHANTERIC BURSITIS OF LEFT HIP: Primary | ICD-10-CM

## 2021-11-04 DIAGNOSIS — M25.552 LEFT HIP PAIN: ICD-10-CM

## 2021-11-04 PROCEDURE — 20610 DRAIN/INJ JOINT/BURSA W/O US: CPT | Performed by: ORTHOPAEDIC SURGERY

## 2021-11-04 PROCEDURE — 99204 OFFICE O/P NEW MOD 45 MIN: CPT | Performed by: ORTHOPAEDIC SURGERY

## 2021-11-04 RX ORDER — LIDOCAINE HYDROCHLORIDE 10 MG/ML
3 INJECTION, SOLUTION EPIDURAL; INFILTRATION; INTRACAUDAL; PERINEURAL
Status: COMPLETED | OUTPATIENT
Start: 2021-11-04 | End: 2021-11-04

## 2021-11-04 RX ORDER — TRIAMCINOLONE ACETONIDE 40 MG/ML
80 INJECTION, SUSPENSION INTRA-ARTICULAR; INTRAMUSCULAR
Status: COMPLETED | OUTPATIENT
Start: 2021-11-04 | End: 2021-11-04

## 2021-11-04 RX ORDER — BUPIVACAINE HYDROCHLORIDE 2.5 MG/ML
3 INJECTION, SOLUTION EPIDURAL; INFILTRATION; INTRACAUDAL
Status: COMPLETED | OUTPATIENT
Start: 2021-11-04 | End: 2021-11-04

## 2021-11-04 RX ADMIN — BUPIVACAINE HYDROCHLORIDE 3 ML: 2.5 INJECTION, SOLUTION EPIDURAL; INFILTRATION; INTRACAUDAL at 09:30

## 2021-11-04 RX ADMIN — TRIAMCINOLONE ACETONIDE 80 MG: 40 INJECTION, SUSPENSION INTRA-ARTICULAR; INTRAMUSCULAR at 09:30

## 2021-11-04 RX ADMIN — LIDOCAINE HYDROCHLORIDE 3 ML: 10 INJECTION, SOLUTION EPIDURAL; INFILTRATION; INTRACAUDAL; PERINEURAL at 09:30

## 2021-11-04 NOTE — PROGRESS NOTES
Orthopaedic Clinic Note: Hip New Patient    Chief Complaint   Patient presents with   • Left Hip - Pain        HPI    Yuni Mclain is a 86 y.o. female who presents with left hip pain for 1 month(s). Onset atraumatic and gradual in nature. Pain is localized to lateral trochanter and radiates down the leg and is a 3/10 on the pain scale.Pain is described as dull and aching. Associated symptoms include pain, grinding and giving way/buckling.  The pain is worse with it is a constant pain with any activity; nothing improve the pain. Previous treatments have included: Use of a cane since symptom onset. Although some transient relief was reported with these interventions, these conservative measures have failed and symptoms have persisted. The patient is limited in daily activities and has had a significant decrease in quality of life as a result. She denies fevers, chills, or constitutional symptoms.    I have reviewed the following portions of the patient's history:History of Present Illness    Past Medical History:   Diagnosis Date   • Atrial contractions, premature    • Benign hypertension    • Chest pain    • Esophagitis    • Heart disease    • Hypercholesterolemia    • Mitral regurgitation     Mild mitral regurgitation by echocardiography.   • Osteoarthritis    • Palpitations     Palpitations.  Reviewed the results of her Zio monitor with her.  Again, they are premature atrial contractions with no arrhythmias or pauses.  Will continue with flecainide, provided refills, and see her back in 8 months.    • Pneumonia    • UTI (urinary tract infection)       Past Surgical History:   Procedure Laterality Date   • APPENDECTOMY     • BACK SURGERY      Low   • BLADDER SURGERY     • BREAST SURGERY      mammoplasty-reduction   • BUNIONECTOMY Bilateral    • CARDIAC CATHETERIZATION N/A 3/26/2021    Procedure: Left Heart Cath;  Surgeon: Lewis Echols III, MD;  Location: [facility] CATH INVASIVE LOCATION;  Service: Cardiovascular;   Laterality: N/A;   • CARDIAC SURGERY      cardiac stent x 1 ()   • CARPAL TUNNEL RELEASE Bilateral    • EYE SURGERY      retina patch bilat ()   • HYSTERECTOMY     • KNEE ARTHROPLASTY Right    • REPLACEMENT TOTAL KNEE Right    • SHOULDER ARTHROSCOPY W/ ROTATOR CUFF REPAIR Left 2017    Procedure: SHOULDER ARTHROSCOPY WITH ROTATOR CUFF REPAIR LEFT;  Surgeon: Parrish Bright MD;  Location: [facility];  Service:       Family History   Problem Relation Age of Onset   • Heart disease Mother    • Cancer Father    • Arthritis Father      Social History     Socioeconomic History   • Marital status:    Tobacco Use   • Smoking status: Former Smoker     Types: Cigarettes     Quit date: 1969     Years since quittin.9   • Smokeless tobacco: Never Used   Substance and Sexual Activity   • Alcohol use: Yes     Comment: occasionally   • Drug use: No   • Sexual activity: Defer      Current Outpatient Medications on File Prior to Visit   Medication Sig Dispense Refill   • amLODIPine (NORVASC) 5 MG tablet Take 1 tablet by mouth Daily. 90 tablet 3   • aspirin 81 MG EC tablet Take 81 mg by mouth Daily.     • cholecalciferol (VITAMIN D3) 1000 units tablet Take 5,000 Units by mouth Daily.     • ezetimibe (ZETIA) 10 MG tablet Take 1 tablet by mouth Daily. 90 tablet 3   • folic acid (FOLVITE) 400 MCG tablet Take 400 mcg by mouth Daily.     • hydroCHLOROthiazide (HYDRODIURIL) 25 MG tablet Take 1 tablet by mouth Daily. 90 tablet 3   • isosorbide mononitrate (IMDUR) 30 MG 24 hr tablet Take 1 tablet by mouth Daily. 30 tablet 11   • niacin 500 MG tablet Take 1 tablet by mouth Every Night. 90 tablet 3   • Sotalol HCl AF 80 MG tablet Take 0.5 tablets by mouth 2 (Two) Times a Day. 90 tablet 1     No current facility-administered medications on file prior to visit.      Allergies   Allergen Reactions   • Albuterol Other (See Comments)     Unknown reactions   • Demerol [Meperidine] Itching   • Statins Myalgia     "    Review of Systems   Constitutional: Negative.    HENT: Positive for hearing loss.    Eyes: Negative.    Respiratory: Negative.    Cardiovascular: Negative.    Gastrointestinal: Negative.    Endocrine: Negative.    Genitourinary: Negative.    Musculoskeletal: Positive for arthralgias and back pain.   Skin: Negative.    Allergic/Immunologic: Negative.    Neurological: Negative.    Hematological: Negative.    Psychiatric/Behavioral: Negative.         The patient's Review of Systems was personally reviewed and confirmed as accurate.    The following portions of the patient's history were reviewed and updated as appropriate: allergies, current medications, past family history, past medical history, past social history, past surgical history and problem list.    Physical Exam  Blood pressure 142/86, height 168.9 cm (66.5\"), weight 65.2 kg (143 lb 11.2 oz).    Body mass index is 22.85 kg/m².    GENERAL APPEARANCE: awake, alert & oriented x 3, in no acute distress and well developed, well nourished  PSYCH: normal affect  LUNGS:  breathing nonlabored  EYES: sclera anicteric  CARDIOVASCULAR: palpable dorsalis pedis, palpable posterior tibial bilaterally. Capillary refill less than 2 seconds  EXTREMITIES: no clubbing, cyanosis  GAIT:  Antalgic           Right Hip Exam:  RANGE OF MOTION:   FLEXION CONTRACTURE: None   FLEXION: 110 degrees   INTERNAL ROTATION: 20 degrees at 90 degrees of flexion   EXTERNAL ROTATION: 40 degrees at 90 degrees of flexion    PAIN WITH HIP MOTION: no  PAIN WITH LOGROLL: no  STINCHFIELD TEST: negative    KNEE EXAM: full knee ROM (0-120 degrees), stable to varus and valgus stress at terminal extension and 30 degrees flexion     STRENGTH:  5/5 hip adduction, abduction, flexion. 5/5 strength knee flexion, extension. 5/5 strength ankle dorsiflexion and plantarflexion.     GREATER TROCHANTER BURSAL PAIN:  no     REFLEXES:   PATELLAR 2+/4   ACHILLES 2+/4    CLONUS: negative  STRAIGHT LEG TEST:   " negative    SENSATION TO LIGHT TOUCH:  DEEP PERONEAL/SUPERFICIAL PERONEAL/SURAL/SAPHENOUS/TIBIAL:  intact    EDEMA:   no  ERYTHEMA:  no  WOUNDS/INCISIONS: no overlying skin problems.      Left Hip Exam:   RANGE OF MOTION:   FLEXION CONTRACTURE: None   FLEXION: 110 degrees   INTERNAL ROTATION: 20 degrees at 90 degrees of flexion   EXTERNAL ROTATION: 40 degrees at 90 degrees of flexion    PAIN WITH HIP MOTION: Yes, laterally trochanteric bursa  PAIN WITH LOGROLL: no  STINCHFIELD TEST: negative    KNEE EXAM: full knee ROM (0-120 degrees), stable to varus and valgus stress at terminal extension and 30 degrees flexion     STRENGTH:  5/5 hip adduction, abduction, flexion. 5/5 strength knee flexion, extension. 5/5 strength ankle dorsiflexion and plantarflexion.     GREATER TROCHANTER BURSAL PAIN:  yes     REFLEXES:   PATELLAR 2+/4   ACHILLES 2+/4    CLONUS: negative  STRAIGHT LEG TEST:   negative    SENSATION TO LIGHT TOUCH:  DEEP PERONEAL/SUPERFICIAL PERONEAL/SURAL/SAPHENOUS/TIBIAL:  intact    EDEMA:   no  ERYTHEMA:  no  WOUNDS/INCISIONS: no overlying skin problems.      ------------------------------------------------------------------    LEG LENGTHS:  equal  _____________________________________________________  _____________________________________________________    RADIOGRAPHIC FINDINGS:   Indication: Left hip pain    Comparison: No prior xrays are available for comparison    AP pelvis, hip 2 views: Right: moderate joint space narrowing,  there are marginal osteophytes visualized at the femoral head-neck junction and acetabular margins; Left: mild joint space narrowing, minimal osteophyte formation    Assessment/Plan:   Diagnosis Plan   1. Trochanteric bursitis of left hip     2. Left hip pain  XR Hip With or Without Pelvis 2 - 3 View Left   3. Radiculopathy, lumbar region       Patient's hip pain is due to trochanteric bursitis.  While she does have hip osteoarthritis, is asymptomatic on exam.  In addition to the  bursitis, she is also suffering from lumbar radiculopathy.  I recommended that she follow-up with her primary care regarding referral to a back doctor for treatment of her lumbar radiculopathy.  In regards to trochanteric bursitis, I recommended a cortisone injection left intertrochanteric bursa today.  She is agreeable to this plan.  She will follow-up as needed.    Huan Hansen MD  11/04/21  09:27 EDT

## 2021-11-04 NOTE — PROGRESS NOTES
Procedure   Large Joint Arthrocentesis: L greater trochanteric bursa  Date/Time: 11/4/2021 9:30 AM  Consent given by: patient  Site marked: site marked  Timeout: Immediately prior to procedure a time out was called to verify the correct patient, procedure, equipment, support staff and site/side marked as required   Supporting Documentation  Indications: pain   Procedure Details  Location: hip - L greater trochanteric bursa  Preparation: Patient was prepped and draped in the usual sterile fashion  Needle size: 22 G  Approach: lateral  Medications administered: 3 mL bupivacaine (PF) 0.25 %; 3 mL lidocaine PF 1% 1 %; 80 mg triamcinolone acetonide 40 MG/ML  Patient tolerance: patient tolerated the procedure well with no immediate complications

## 2021-11-08 ENCOUNTER — TELEPHONE (OUTPATIENT)
Dept: ORTHOPEDIC SURGERY | Facility: CLINIC | Age: 86
End: 2021-11-08

## 2021-11-08 NOTE — TELEPHONE ENCOUNTER
I spoke with the patient and advised her that it can take up to a whole week for the injection to kick in and work completely. I also asked if she had been taking any antiinflammatories to help with the pain and she has been taking Tylenol and Ibuprofen and rotating these. I told her to continue to do this and call us by Thursday if she is having issues still and we will send a message to Dr. Hansen. She understood.    Maira LE

## 2021-11-08 NOTE — TELEPHONE ENCOUNTER
Provider: DUDLEY  Caller:ROSA  Relationship to Patient: SELF  Phone Number: [phone]  Reason for Call: WAS ADVISED TO CALL IF LAST INJ DIDN'T HELP.  PER PATIENT LAST INJ HASN'T HELPED AD DR BUCKNER ADVISED HER TO CALL IF THAT HAPPENED.  PATIENT REQ CALL BACK.  When was the patient last seen: 11 04 2021

## 2021-11-19 ENCOUNTER — OFFICE VISIT (OUTPATIENT)
Dept: ORTHOPEDIC SURGERY | Facility: CLINIC | Age: 86
End: 2021-11-19

## 2021-11-19 VITALS
DIASTOLIC BLOOD PRESSURE: 79 MMHG | WEIGHT: 143 LBS | BODY MASS INDEX: 22.98 KG/M2 | HEART RATE: 76 BPM | SYSTOLIC BLOOD PRESSURE: 149 MMHG | HEIGHT: 66 IN

## 2021-11-19 DIAGNOSIS — M54.17 LUMBOSACRAL RADICULOPATHY AT L5: Primary | ICD-10-CM

## 2021-11-19 PROCEDURE — 99214 OFFICE O/P EST MOD 30 MIN: CPT | Performed by: ORTHOPAEDIC SURGERY

## 2021-11-19 RX ORDER — METHYLPREDNISOLONE 4 MG/1
TABLET ORAL
Qty: 21 TABLET | Refills: 0 | Status: SHIPPED | OUTPATIENT
Start: 2021-11-19 | End: 2022-08-22

## 2021-11-19 NOTE — PROGRESS NOTES
Orthopaedic Clinic Note: Hip Established Patient    Chief Complaint   Patient presents with   • Follow-up     2 weeks follow up Trochanteric bursitis of left hip        HPI    It has been 2  week(s) since Ms. Mclain's last visit. She returns to clinic today for follow-up left hip pain.  She comes clinic today having undergone trochanteric bursa cortisone injection 2 weeks ago.  The injection failed to provide any significant relief.  She is continue to have pain localized down the left lower extremity to the top of the foot.  It is radiating in the L5 nerve distribution.  She rates the pain an 8/10 on the pain scale.  Overall she is doing worse.    Past Medical History:   Diagnosis Date   • Atrial contractions, premature    • Benign hypertension    • Chest pain    • Esophagitis    • Heart disease    • Hypercholesterolemia    • Mitral regurgitation     Mild mitral regurgitation by echocardiography.   • Osteoarthritis    • Palpitations     Palpitations.  Reviewed the results of her Zio monitor with her.  Again, they are premature atrial contractions with no arrhythmias or pauses.  Will continue with flecainide, provided refills, and see her back in 8 months.    • Pneumonia    • UTI (urinary tract infection)       Past Surgical History:   Procedure Laterality Date   • APPENDECTOMY     • BACK SURGERY      Low   • BLADDER SURGERY     • BREAST SURGERY      mammoplasty-reduction   • BUNIONECTOMY Bilateral    • CARDIAC CATHETERIZATION N/A 3/26/2021    Procedure: Left Heart Cath;  Surgeon: Lewis Echols III, MD;  Location: [facility] INVASIVE LOCATION;  Service: Cardiovascular;  Laterality: N/A;   • CARDIAC SURGERY      cardiac stent x 1 (2001)   • CARPAL TUNNEL RELEASE Bilateral    • EYE SURGERY      retina patch bilat (1980)   • HYSTERECTOMY     • KNEE ARTHROPLASTY Right    • REPLACEMENT TOTAL KNEE Right 2015   • SHOULDER ARTHROSCOPY W/ ROTATOR CUFF REPAIR Left 1/5/2017    Procedure: SHOULDER ARTHROSCOPY WITH ROTATOR CUFF  REPAIR LEFT;  Surgeon: Parrish Bright MD;  Location: [facility];  Service:       Family History   Problem Relation Age of Onset   • Heart disease Mother    • Cancer Father    • Arthritis Father      Social History     Socioeconomic History   • Marital status:    Tobacco Use   • Smoking status: Former Smoker     Types: Cigarettes     Quit date: 1969     Years since quittin.9   • Smokeless tobacco: Never Used   Substance and Sexual Activity   • Alcohol use: Yes     Comment: occasionally   • Drug use: No   • Sexual activity: Defer      Current Outpatient Medications on File Prior to Visit   Medication Sig Dispense Refill   • amLODIPine (NORVASC) 5 MG tablet Take 1 tablet by mouth Daily. 90 tablet 3   • aspirin 81 MG EC tablet Take 81 mg by mouth Daily.     • cholecalciferol (VITAMIN D3) 1000 units tablet Take 5,000 Units by mouth Daily.     • ezetimibe (ZETIA) 10 MG tablet Take 1 tablet by mouth Daily. 90 tablet 3   • folic acid (FOLVITE) 400 MCG tablet Take 400 mcg by mouth Daily.     • hydroCHLOROthiazide (HYDRODIURIL) 25 MG tablet Take 1 tablet by mouth Daily. 90 tablet 3   • isosorbide mononitrate (IMDUR) 30 MG 24 hr tablet Take 1 tablet by mouth Daily. 30 tablet 11   • niacin 500 MG tablet Take 1 tablet by mouth Every Night. 90 tablet 3   • Sotalol HCl AF 80 MG tablet Take 0.5 tablets by mouth 2 (Two) Times a Day. 90 tablet 1     No current facility-administered medications on file prior to visit.      Allergies   Allergen Reactions   • Albuterol Other (See Comments)     Unknown reactions   • Demerol [Meperidine] Itching   • Statins Myalgia        Review of Systems   Constitutional: Negative.    HENT: Negative.    Eyes: Negative.    Respiratory: Negative.    Cardiovascular: Negative.    Gastrointestinal: Negative.    Endocrine: Negative.    Genitourinary: Negative.    Musculoskeletal: Positive for arthralgias.   Skin: Negative.    Allergic/Immunologic: Negative.    Neurological: Negative.   "  Hematological: Negative.    Psychiatric/Behavioral: Negative.         The patient's Review of Systems was personally reviewed and confirmed as accurate.    Physical Exam  Blood pressure 149/79, pulse 76, height 168.9 cm (66.5\"), weight 64.9 kg (143 lb).    Body mass index is 22.74 kg/m².    GENERAL APPEARANCE: awake, alert, oriented, in no acute distress and well developed, well nourished  LUNGS:  breathing nonlabored  EXTREMITIES: no clubbing, cyanosis  PERIPHERAL PULSES: palpable dorsalis pedis and posterior tibial pulses bilaterally.    GAIT:  Antalgic            Hip Exam:  Left    RANGE OF MOTION:  EXTENSION/FLEXION:  normal (0-110 degrees)  IR (at 90 degrees of flexion):  20  ER (at 90 degrees of flexion):  40  PAIN WITH HIP MOTION:  no  PAIN WITH LOGROLL:  no     STINCHFIELD TEST: negative    STRENGTH:  ABDUCTOR:  5/5  ADDUCTOR:  5/5  HIP FLEXION:  5/5    GREATER TROCHANTER BURSAL PAIN:  yes    SENSATION TO LIGHT TOUCH:  DEEP PERONEAL/SUPERFICIAL PERONEAL/SURAL/SAPHENOUS/TIBIAL:   intact and intact, except Paresthesias in L5 nerve distribution    EDEMA:  no  ERYTHEMA:  no  WOUNDS/INCISIONS:   no  _________________________________________________________________  _________________________________________________________________    RADIOGRAPHIC FINDINGS:   No new imaging today    Assessment/Plan:   Diagnosis Plan   1. Lumbosacral radiculopathy at L5  methylPREDNISolone (MEDROL) 4 MG dose pack    Ambulatory Referral to Physical Therapy Evaluate and treat    Ambulatory Referral to Neurosurgery     Patient is continue to have pain that is related to L5 radiculopathy affecting the left lower extremity.  The trochanteric bursa injection failed to provide any significant relief.  I recommended a oral steroid.  Medrol Dosepak will be prescribed in addition to referral to physical therapy for formal treatment for her radicular pain.  Finally, I will refer her to neurosurgery for further evaluation of her radiculopathy " pain.    Huan Hansen MD  11/19/21  09:59 EST

## 2021-12-06 DIAGNOSIS — M54.17 LUMBOSACRAL RADICULOPATHY AT L5: ICD-10-CM

## 2021-12-06 NOTE — TELEPHONE ENCOUNTER
Patient asked for a steroid pack and an MRI of her lower back. I told her Dr. Hansen had prescribed a steroid pack on 11.19. She denies knowing about this. I told her I would have it resent. Patient has been referred to Neurosurgery and according to the communications of the referral there was a voicemail left to schedule appointment 11.23.2021. She denies ever receiving this call. She also denies knowledge of a referral to Neurosurgery.  Dr. Hansen's last note reflects a referral to Neurosurgery.  She says she wants Dr. Hansen to treat her back.  I reminded her that he is a hip and knee specialist and she will need to see Neurosurgery for treatment of her back.  I provided the number of [facility] for the patient to call to set up appointment.

## 2021-12-06 NOTE — TELEPHONE ENCOUNTER
Jaimee,    Will you resend Medrol dose pack?  Patient says it was never sent to her pharmacy. It is [facility] in [place].    Thanks!  Marcellus

## 2021-12-08 RX ORDER — METHYLPREDNISOLONE 4 MG/1
TABLET ORAL
Qty: 21 TABLET | Refills: 0 | OUTPATIENT
Start: 2021-12-08

## 2021-12-08 NOTE — TELEPHONE ENCOUNTER
Patient picked up medrol pack yesterday 12.07.21,  I left a voicemail with the emergency contact about scheduling with Neurosurgery.

## 2021-12-14 ENCOUNTER — HOSPITAL ENCOUNTER (OUTPATIENT)
Age: 86
End: 2021-12-14
Payer: MEDICARE

## 2021-12-14 DIAGNOSIS — M54.42: Primary | ICD-10-CM

## 2021-12-14 DIAGNOSIS — Z98.890: ICD-10-CM

## 2021-12-14 DIAGNOSIS — R29.898: ICD-10-CM

## 2021-12-14 LAB
BUN SERPL-MCNC: 15 MG/DL (ref 7–17)
CREAT BLD-SCNC: 0.7 MG/DL (ref 0.52–1.04)
ESTIMATED GLOMERULAR FILT RATE: 80 ML/MIN (ref 60–?)
GFR (AFRICAN AMERICAN): 96 ML/MIN (ref 60–?)

## 2021-12-14 PROCEDURE — 36415 COLL VENOUS BLD VENIPUNCTURE: CPT

## 2021-12-14 PROCEDURE — A9576 INJ PROHANCE MULTIPACK: HCPCS

## 2021-12-14 PROCEDURE — 84520 ASSAY OF UREA NITROGEN: CPT

## 2021-12-14 PROCEDURE — 82565 ASSAY OF CREATININE: CPT

## 2021-12-14 PROCEDURE — 76376 3D RENDER W/INTRP POSTPROCES: CPT

## 2021-12-14 PROCEDURE — 72158 MRI LUMBAR SPINE W/O & W/DYE: CPT

## 2021-12-21 RX ORDER — SOTALOL HYDROCHLORIDE 80 MG/1
40 TABLET ORAL 2 TIMES DAILY
Qty: 90 TABLET | Refills: 0 | Status: SHIPPED | OUTPATIENT
Start: 2021-12-21 | End: 2022-02-16 | Stop reason: SDUPTHER

## 2021-12-21 RX ORDER — AMLODIPINE BESYLATE 5 MG/1
TABLET ORAL
Qty: 90 TABLET | Refills: 0 | OUTPATIENT
Start: 2021-12-21

## 2021-12-21 RX ORDER — AMLODIPINE BESYLATE 5 MG/1
5 TABLET ORAL DAILY
Qty: 90 TABLET | Refills: 0 | Status: SHIPPED | OUTPATIENT
Start: 2021-12-21 | End: 2022-02-16 | Stop reason: SDUPTHER

## 2022-01-24 ENCOUNTER — HOSPITAL ENCOUNTER (OUTPATIENT)
Age: 87
End: 2022-01-24
Payer: MEDICARE

## 2022-01-24 VITALS
RESPIRATION RATE: 18 BRPM | OXYGEN SATURATION: 95 % | DIASTOLIC BLOOD PRESSURE: 71 MMHG | HEART RATE: 59 BPM | SYSTOLIC BLOOD PRESSURE: 152 MMHG

## 2022-01-24 VITALS — BODY MASS INDEX: 23.7 KG/M2

## 2022-01-24 DIAGNOSIS — M54.16: ICD-10-CM

## 2022-01-24 DIAGNOSIS — M48.00: ICD-10-CM

## 2022-01-24 DIAGNOSIS — G95.19: ICD-10-CM

## 2022-01-24 DIAGNOSIS — M47.896: Primary | ICD-10-CM

## 2022-01-24 PROCEDURE — 99202 OFFICE O/P NEW SF 15 MIN: CPT

## 2022-01-24 PROCEDURE — G0463 HOSPITAL OUTPT CLINIC VISIT: HCPCS

## 2022-01-28 ENCOUNTER — HOSPITAL ENCOUNTER (OUTPATIENT)
Dept: HOSPITAL 22 - SC.PAINP | Age: 87
Discharge: HOME | End: 2022-01-28
Payer: MEDICARE

## 2022-01-28 VITALS
RESPIRATION RATE: 20 BRPM | SYSTOLIC BLOOD PRESSURE: 123 MMHG | OXYGEN SATURATION: 96 % | HEART RATE: 57 BPM | DIASTOLIC BLOOD PRESSURE: 63 MMHG

## 2022-01-28 VITALS
DIASTOLIC BLOOD PRESSURE: 77 MMHG | HEART RATE: 60 BPM | SYSTOLIC BLOOD PRESSURE: 106 MMHG | RESPIRATION RATE: 18 BRPM | OXYGEN SATURATION: 98 % | TEMPERATURE: 97.8 F

## 2022-01-28 VITALS — RESPIRATION RATE: 18 BRPM | HEART RATE: 52 BPM | OXYGEN SATURATION: 95 %

## 2022-01-28 VITALS
OXYGEN SATURATION: 97 % | SYSTOLIC BLOOD PRESSURE: 120 MMHG | DIASTOLIC BLOOD PRESSURE: 55 MMHG | HEART RATE: 51 BPM | RESPIRATION RATE: 18 BRPM

## 2022-01-28 VITALS — BODY MASS INDEX: 23.7 KG/M2

## 2022-01-28 DIAGNOSIS — M51.16: Primary | ICD-10-CM

## 2022-01-28 DIAGNOSIS — I10: ICD-10-CM

## 2022-01-28 DIAGNOSIS — M48.062: ICD-10-CM

## 2022-01-28 PROCEDURE — 62323 NJX INTERLAMINAR LMBR/SAC: CPT

## 2022-02-02 RX ORDER — HYDROCHLOROTHIAZIDE 25 MG/1
TABLET ORAL
Qty: 90 TABLET | Refills: 0 | Status: SHIPPED | OUTPATIENT
Start: 2022-02-02 | End: 2022-02-16 | Stop reason: SDUPTHER

## 2022-02-16 ENCOUNTER — OFFICE VISIT (OUTPATIENT)
Dept: CARDIOLOGY | Facility: CLINIC | Age: 87
End: 2022-02-16

## 2022-02-16 VITALS
WEIGHT: 138 LBS | DIASTOLIC BLOOD PRESSURE: 74 MMHG | HEART RATE: 60 BPM | SYSTOLIC BLOOD PRESSURE: 136 MMHG | OXYGEN SATURATION: 97 % | BODY MASS INDEX: 21.66 KG/M2 | HEIGHT: 67 IN

## 2022-02-16 DIAGNOSIS — I49.1 ATRIAL CONTRACTIONS, PREMATURE: Primary | ICD-10-CM

## 2022-02-16 DIAGNOSIS — I10 BENIGN HYPERTENSION: ICD-10-CM

## 2022-02-16 DIAGNOSIS — I25.10 CORONARY ARTERY DISEASE INVOLVING NATIVE CORONARY ARTERY OF NATIVE HEART WITHOUT ANGINA PECTORIS: ICD-10-CM

## 2022-02-16 DIAGNOSIS — I34.0 NONRHEUMATIC MITRAL VALVE REGURGITATION: ICD-10-CM

## 2022-02-16 DIAGNOSIS — E78.00 HYPERCHOLESTEROLEMIA: ICD-10-CM

## 2022-02-16 PROCEDURE — 99214 OFFICE O/P EST MOD 30 MIN: CPT | Performed by: INTERNAL MEDICINE

## 2022-02-16 RX ORDER — EZETIMIBE 10 MG/1
10 TABLET ORAL DAILY
Qty: 90 TABLET | Refills: 3 | Status: SHIPPED | OUTPATIENT
Start: 2022-02-16 | End: 2022-12-12

## 2022-02-16 RX ORDER — SOTALOL HYDROCHLORIDE 80 MG/1
80 TABLET ORAL 2 TIMES DAILY
Qty: 180 TABLET | Refills: 1 | Status: SHIPPED | OUTPATIENT
Start: 2022-02-16 | End: 2022-08-22 | Stop reason: SDUPTHER

## 2022-02-16 RX ORDER — HYDROCHLOROTHIAZIDE 25 MG/1
25 TABLET ORAL DAILY
Qty: 90 TABLET | Refills: 3 | Status: SHIPPED | OUTPATIENT
Start: 2022-02-16

## 2022-02-16 RX ORDER — ISOSORBIDE MONONITRATE 30 MG/1
30 TABLET, EXTENDED RELEASE ORAL DAILY
Qty: 90 TABLET | Refills: 3 | Status: SHIPPED | OUTPATIENT
Start: 2022-02-16 | End: 2023-03-14

## 2022-02-16 RX ORDER — AMLODIPINE BESYLATE 5 MG/1
5 TABLET ORAL DAILY
Qty: 90 TABLET | Refills: 0 | Status: SHIPPED | OUTPATIENT
Start: 2022-02-16 | End: 2022-07-11

## 2022-02-16 NOTE — PROGRESS NOTES
[facility]  Office visit  Yuni Mclain  1935  [phone]    VISIT DATE:  2/16/2022      PCP: Markell Cho MD  [address] [place], [place] [id number]    CC:  Chief Complaint   Patient presents with   • Hypertension       PROBLEM LIST:  1. Premature atrial contractions and premature ventricular contractions:  a. Recent Zio monitoring confirming PACs, no arrhythmias, no pauses.  2. Hypercholesterolemia.  3. Benign hypertension.  4. Chest pain:  a. Mild to moderate coronary artery disease by catheterization, 2003, medically managed.  b. Recurrent episode since November using nitroglycerin x1.  5. Mild mitral regurgitation by echocardiography.      Previous cardiac studies and procedures:  2003 approximately: PCI with stenting, data insufficient.    July 2019 echo  · Estimated EF appears to be in the range of 66 - 70%.  · Left ventricular wall thickness is consistent with hypertrophy. Sigmoid-shaped ventricular septum is present.  · Left ventricular diastolic dysfunction (grade I a) consistent with impaired relaxation.    March 2021   cardiac catheterization  · 60% proximal LAD, otherwise mild nonobstructive atherosclerosis.  · Patent mid left circumflex stent  · Normal LVEDP  · Normal LVEF  · No aortic stenosis  · No angiographically significant mitral regurgitation  48-hour Holter monitor: The predominant rhythm noted during the testing period was sinus rhythm. Premature atrial contractions occured rarely. Premature ventricular contractions did not appear during monitoring.    ASSESSMENT:   Diagnosis Plan   1. Atrial contractions, premature     2. Benign hypertension     3. Coronary artery disease involving native coronary artery of native heart without angina pectoris     4. Hypercholesterolemia     5. Nonrheumatic mitral valve regurgitation         PLAN:  -Premature atrial contractions: Off class Ic agents due to history of CAD.  Increasing sotalol to 80 mg p.o. twice  "daily..    -Hypertension: Goal less than 140/90 mmHg, ideally less than 130/80 mmHg.  Currently with reasonable control.  Continue current medical therapy.    -Dyslipidemia: Continue current medical therapy.    Coronary artery disease: Currently stable asymptomatic.  Remote PCI.  Continue current medical therapy.    Subjective  History of whitecoat hypertension.  Blood pressures predominantly running less than 140/90 mmHg.  She is compliant with medical therapy.  Still noticing palpitations at night when she is trying to read a book or go to sleep at night.    PHYSICAL EXAMINATION:  Vitals:    02/16/22 1518   BP: 136/74   BP Location: Right arm   Patient Position: Sitting   Pulse: 60   SpO2: 97%   Weight: 62.6 kg (138 lb)   Height: 168.9 cm (66.5\")     General Appearance:    Alert, cooperative, no distress, appears stated age   Head:    Normocephalic, without obvious abnormality, atraumatic   Eyes:    conjunctiva/corneas clear   Nose:   Nares normal, septum midline, mucosa normal, no drainage   Throat:   Lips, teeth and gums normal   Neck:   Supple, symmetrical, trachea midline, no carotid    bruit or JVD   Lungs:     Clear to auscultation bilaterally, respirations unlabored   Chest Wall:    No tenderness or deformity, I do not appreciate a bruit over the right upper chest.    Heart:    Regular rate and rhythm, S1 and S2 normal, 2/6 early peaking systolic murmur right upper sternal border, rub   or gallop, normal carotid impulse bilaterally without bruit.   Abdomen:     Soft, non-tender   Extremities:   Extremities normal, atraumatic, no cyanosis or edema   Pulses:   2+ and symmetric all extremities   Skin:   Skin color, texture, turgor normal, no rashes or lesions       Diagnostic Data:  Procedures  Lab Results   Component Value Date    TRIG 87 03/26/2021    HDL 78 (H) 03/26/2021     Lab Results   Component Value Date    GLUCOSE 100 (H) 03/26/2021    BUN 16 03/26/2021    CREATININE 0.70 03/26/2021     " 03/26/2021    K 3.7 03/26/2021     03/26/2021    CO2 29.0 03/26/2021     Lab Results   Component Value Date    HGBA1C 4.8 07/05/2015     Lab Results   Component Value Date    WBC 5.58 03/26/2021    HGB 13.0 03/26/2021    HCT 40.2 03/26/2021     03/26/2021       Allergies  Allergies   Allergen Reactions   • Albuterol Other (See Comments)     Unknown reactions   • Demerol [Meperidine] Itching   • Statins Myalgia       Current Medications    Current Outpatient Medications:   •  amLODIPine (NORVASC) 5 MG tablet, Take 1 tablet by mouth Daily. Need f/u appt., Disp: 90 tablet, Rfl: 0  •  aspirin 81 MG EC tablet, Take 81 mg by mouth Daily., Disp: , Rfl:   •  cholecalciferol (VITAMIN D3) 1000 units tablet, Take 5,000 Units by mouth Daily., Disp: , Rfl:   •  ezetimibe (ZETIA) 10 MG tablet, Take 1 tablet by mouth Daily., Disp: 90 tablet, Rfl: 3  •  folic acid (FOLVITE) 400 MCG tablet, Take 400 mcg by mouth Daily., Disp: , Rfl:   •  hydroCHLOROthiazide (HYDRODIURIL) 25 MG tablet, Take 1 tablet by mouth once daily, Disp: 90 tablet, Rfl: 0  •  isosorbide mononitrate (IMDUR) 30 MG 24 hr tablet, Take 1 tablet by mouth Daily., Disp: 30 tablet, Rfl: 11  •  methylPREDNISolone (MEDROL) 4 MG dose pack, Use as directed by package instructions, Disp: 21 tablet, Rfl: 0  •  niacin 500 MG tablet, Take 1 tablet by mouth Every Night., Disp: 90 tablet, Rfl: 3  •  sotalol (BETAPACE AF) 80 MG tablet tablet, Take 0.5 tablets by mouth 2 (Two) Times a Day. Need f/u appt, Disp: 90 tablet, Rfl: 0          ROS  Review of Systems   Cardiovascular: Positive for irregular heartbeat and palpitations. Negative for chest pain and dyspnea on exertion.   Respiratory: Positive for snoring. Negative for cough.    Genitourinary: Positive for frequency.       SOCIAL HX  Social History     Socioeconomic History   • Marital status:    Tobacco Use   • Smoking status: Former Smoker     Types: Cigarettes     Quit date: 12/16/1969     Years since  quittin.2   • Smokeless tobacco: Never Used   Substance and Sexual Activity   • Alcohol use: Yes     Comment: occasionally   • Drug use: No   • Sexual activity: Defer       FAMILY HX  Family History   Problem Relation Age of Onset   • Heart disease Mother    • Cancer Father    • Arthritis Father              Lewis Echols III, MD, FACC

## 2022-02-17 ENCOUNTER — HOSPITAL ENCOUNTER (OUTPATIENT)
Age: 87
End: 2022-02-17
Payer: MEDICARE

## 2022-02-17 VITALS
HEART RATE: 58 BPM | OXYGEN SATURATION: 98 % | DIASTOLIC BLOOD PRESSURE: 62 MMHG | RESPIRATION RATE: 18 BRPM | SYSTOLIC BLOOD PRESSURE: 135 MMHG

## 2022-02-17 VITALS — BODY MASS INDEX: 21.6 KG/M2

## 2022-02-17 DIAGNOSIS — M48.062: ICD-10-CM

## 2022-02-17 DIAGNOSIS — M51.16: Primary | ICD-10-CM

## 2022-02-17 PROCEDURE — G0463 HOSPITAL OUTPT CLINIC VISIT: HCPCS

## 2022-02-17 PROCEDURE — 99212 OFFICE O/P EST SF 10 MIN: CPT

## 2022-03-17 ENCOUNTER — HOSPITAL ENCOUNTER (OUTPATIENT)
Age: 87
End: 2022-03-17
Payer: MEDICARE

## 2022-03-17 VITALS
RESPIRATION RATE: 20 BRPM | HEART RATE: 57 BPM | TEMPERATURE: 98.1 F | DIASTOLIC BLOOD PRESSURE: 60 MMHG | SYSTOLIC BLOOD PRESSURE: 144 MMHG | OXYGEN SATURATION: 97 %

## 2022-03-17 VITALS — BODY MASS INDEX: 22.1 KG/M2

## 2022-03-17 DIAGNOSIS — M48.062: ICD-10-CM

## 2022-03-17 DIAGNOSIS — M51.16: Primary | ICD-10-CM

## 2022-03-17 PROCEDURE — 99212 OFFICE O/P EST SF 10 MIN: CPT

## 2022-03-17 PROCEDURE — G0463 HOSPITAL OUTPT CLINIC VISIT: HCPCS

## 2022-03-21 ENCOUNTER — HOSPITAL ENCOUNTER (OUTPATIENT)
Age: 87
End: 2022-03-21
Payer: MEDICARE

## 2022-03-21 DIAGNOSIS — Z11.52: ICD-10-CM

## 2022-03-21 DIAGNOSIS — M51.36: ICD-10-CM

## 2022-03-21 DIAGNOSIS — Z01.818: Primary | ICD-10-CM

## 2022-03-21 LAB
ANION GAP SERPL CALC-SCNC: 8.7 MEQ/L (ref 5–15)
BUN SERPL-MCNC: 13 MG/DL (ref 7–17)
CALCIUM SPEC-MCNC: 8.8 MG/DL (ref 8.4–10.2)
CHLORIDE SPEC-SCNC: 94 MMOL/L (ref 98–107)
CO2 SERPL-SCNC: 32 MMOL/L (ref 22–30)
CREAT BLD-SCNC: 0.8 MG/DL (ref 0.52–1.04)
ESTIMATED GLOMERULAR FILT RATE: 68 ML/MIN (ref 60–?)
GFR (AFRICAN AMERICAN): 82 ML/MIN (ref 60–?)
GLUCOSE: 106 MG/DL (ref 74–100)
HCT VFR BLD CALC: 40 % (ref 37–47)
HGB BLD-MCNC: 12.5 G/DL (ref 12.2–16.2)
MCHC RBC-ENTMCNC: 31.4 G/DL (ref 31.8–35.4)
MCV RBC: 101.6 FL (ref 81–99)
MEAN CORPUSCULAR HEMOGLOBIN: 31.9 PG (ref 27–31.2)
PLATELET # BLD: 256 K/MM3 (ref 142–424)
POTASSIUM: 3.7 MMOL/L (ref 3.5–5.1)
RBC # BLD AUTO: 3.94 M/MM3 (ref 4.2–5.4)
SODIUM SPEC-SCNC: 131 MMOL/L (ref 136–145)
WBC # BLD AUTO: 6.9 K/MM3 (ref 4.8–10.8)

## 2022-03-21 PROCEDURE — 85025 COMPLETE CBC W/AUTO DIFF WBC: CPT

## 2022-03-21 PROCEDURE — 36415 COLL VENOUS BLD VENIPUNCTURE: CPT

## 2022-03-21 PROCEDURE — 80048 BASIC METABOLIC PNL TOTAL CA: CPT

## 2022-03-21 PROCEDURE — C9803 HOPD COVID-19 SPEC COLLECT: HCPCS

## 2022-03-21 PROCEDURE — U0003 INFECTIOUS AGENT DETECTION BY NUCLEIC ACID (DNA OR RNA); SEVERE ACUTE RESPIRATORY SYNDROME CORONAVIRUS 2 (SARS-COV-2) (CORONAVIRUS DISEASE [COVID-19]), AMPLIFIED PROBE TECHNIQUE, MAKING USE OF HIGH THROUGHPUT TECHNOLOGIES AS DESCRIBED BY CMS-2020-01-R: HCPCS

## 2022-03-21 PROCEDURE — U0005 INFEC AGEN DETEC AMPLI PROBE: HCPCS

## 2022-03-22 ENCOUNTER — HOSPITAL ENCOUNTER (OUTPATIENT)
Dept: HOSPITAL 22 - OR | Age: 87
Discharge: HOME | End: 2022-03-22
Payer: MEDICARE

## 2022-03-22 VITALS
SYSTOLIC BLOOD PRESSURE: 144 MMHG | RESPIRATION RATE: 18 BRPM | OXYGEN SATURATION: 97 % | TEMPERATURE: 97.4 F | HEART RATE: 55 BPM | DIASTOLIC BLOOD PRESSURE: 100 MMHG

## 2022-03-22 VITALS
SYSTOLIC BLOOD PRESSURE: 133 MMHG | RESPIRATION RATE: 16 BRPM | DIASTOLIC BLOOD PRESSURE: 66 MMHG | HEART RATE: 56 BPM | OXYGEN SATURATION: 97 %

## 2022-03-22 VITALS
SYSTOLIC BLOOD PRESSURE: 153 MMHG | TEMPERATURE: 97.34 F | DIASTOLIC BLOOD PRESSURE: 75 MMHG | RESPIRATION RATE: 16 BRPM | HEART RATE: 56 BPM | OXYGEN SATURATION: 95 %

## 2022-03-22 VITALS
HEART RATE: 56 BPM | SYSTOLIC BLOOD PRESSURE: 121 MMHG | OXYGEN SATURATION: 97 % | DIASTOLIC BLOOD PRESSURE: 75 MMHG | RESPIRATION RATE: 16 BRPM

## 2022-03-22 VITALS
SYSTOLIC BLOOD PRESSURE: 115 MMHG | HEART RATE: 54 BPM | OXYGEN SATURATION: 98 % | TEMPERATURE: 97.34 F | DIASTOLIC BLOOD PRESSURE: 61 MMHG | RESPIRATION RATE: 16 BRPM

## 2022-03-22 VITALS — BODY MASS INDEX: 22.1 KG/M2

## 2022-03-22 VITALS
SYSTOLIC BLOOD PRESSURE: 124 MMHG | HEART RATE: 56 BPM | OXYGEN SATURATION: 98 % | RESPIRATION RATE: 16 BRPM | DIASTOLIC BLOOD PRESSURE: 67 MMHG

## 2022-03-22 DIAGNOSIS — I48.91: ICD-10-CM

## 2022-03-22 DIAGNOSIS — M48.062: Primary | ICD-10-CM

## 2022-03-22 DIAGNOSIS — I25.2: ICD-10-CM

## 2022-03-22 DIAGNOSIS — Z79.899: ICD-10-CM

## 2022-03-22 DIAGNOSIS — Z85.828: ICD-10-CM

## 2022-03-22 DIAGNOSIS — Z88.5: ICD-10-CM

## 2022-03-22 DIAGNOSIS — Z90.49: ICD-10-CM

## 2022-03-22 DIAGNOSIS — E78.5: ICD-10-CM

## 2022-03-22 DIAGNOSIS — Z00.6: ICD-10-CM

## 2022-03-22 DIAGNOSIS — M51.16: ICD-10-CM

## 2022-03-22 DIAGNOSIS — Z88.8: ICD-10-CM

## 2022-03-22 DIAGNOSIS — I10: ICD-10-CM

## 2022-03-22 DIAGNOSIS — M19.90: ICD-10-CM

## 2022-03-22 PROCEDURE — 96374 THER/PROPH/DIAG INJ IV PUSH: CPT

## 2022-03-22 PROCEDURE — 0275T: CPT

## 2022-03-22 PROCEDURE — C1889 IMPLANT/INSERT DEVICE, NOC: HCPCS

## 2022-03-30 NOTE — THERAPY TREATMENT NOTE
Outpatient Physical Therapy Ortho Treatment Note   Soo     Patient Name: Yuni Mclain  : 1935  MRN: [id number]  Today's Date: 8/15/2017      Visit Date: 08/15/2017    Visit Dx:    ICD-10-CM ICD-9-CM   1. Chronic bilateral low back pain with sciatica, sciatica laterality unspecified M54.40 724.2    G89.29 724.3     338.29       Patient Active Problem List   Diagnosis   • Atrial contractions, premature   • Hypercholesterolemia   • Benign hypertension   • Chest pain   • Mitral regurgitation   • Palpitations        Past Medical History:   Diagnosis Date   • Atrial contractions, premature    • Benign hypertension    • Chest pain    • Esophagitis    • Heart disease    • Hypercholesterolemia    • Mitral regurgitation     Mild mitral regurgitation by echocardiography.   • Osteoarthritis    • Palpitations     Palpitations.  Reviewed the results of her Zio monitor with her.  Again, they are premature atrial contractions with no arrhythmias or pauses.  Will continue with flecainide, provided refills, and see her back in 8 months.    • Pneumonia    • UTI (urinary tract infection)         Past Surgical History:   Procedure Laterality Date   • APPENDECTOMY     • BACK SURGERY      Low   • BLADDER SURGERY     • BREAST SURGERY      mammoplasty-reduction   • BUNIONECTOMY Bilateral    • CARDIAC SURGERY      cardiac stent x 1 ()   • CARPAL TUNNEL RELEASE Bilateral    • EYE SURGERY      retina patch bilat ()   • HYSTERECTOMY     • KNEE ARTHROPLASTY Right    • REPLACEMENT TOTAL KNEE Right    • SHOULDER ARTHROSCOPY W/ ROTATOR CUFF REPAIR Left 2017    Procedure: SHOULDER ARTHROSCOPY WITH ROTATOR CUFF REPAIR LEFT;  Surgeon: Parrish Bright MD;  Location: [facility];  Service:                              PT Assessment/Plan       08/15/17 1000       PT Assessment    Assessment Comments Pt educated on ther exercises for private pool at home to encourage increase mobility while spine being more off  loaded. She tolerated progression of hooklying spinal stabilization ther exercises with cueing for correct technique. Pt able to get more relief with hip manual therapy during MWM.   -CP     PT Plan    PT Plan Comments Assess compliance with HEP and progress as tolerated. Could trial quadruped position ther ex if tolerated in bilat knee, hips for neutral spine, scapular stabilization, and core strengthening.   -CP       User Key  (r) = Recorded By, (t) = Taken By, (c) = Cosigned By    Initials Name Provider Type    CP Corinne E Perkins, PT Physical Therapist                    Exercises       08/15/17 1000          Subjective Comments    Subjective Comments Pt states she has pain in the morning before getting up and moving around, states it decreases with a little bit of activity. Pt reports compliance with HEP.   -CP      Subjective Pain    Able to rate subjective pain? yes  -CP      Pre-Treatment Pain Level 7  -CP      Post-Treatment Pain Level 6  -CP      Exercise 1    Exercise Name 1 NuStep L5  -CP      Time (Minutes) 1 5  -CP      Exercise 2    Exercise Name 2 seated piriformis stretch: pullup/push down  -CP      Sets 2 3  -CP      Time (Seconds) 2 30  -CP      Additional Comments each  -CP      Exercise 3    Exercise Name 3 LTR  -CP      Cueing 3 Verbal  -CP      Reps 3 10  -CP      Exercise 4    Exercise Name 4 supine piriformis stretch  -CP      Sets 4 3  -CP      Time (Seconds) 4 30  -CP      Exercise 5    Exercise Name 5 glute iso with mod bridge  -CP      Cueing 5 Verbal  -CP      Reps 5 5  -CP      Exercise 6    Exercise Name 6 seated peroneal nerve glide   modified secondary to increased lat L hip pain w/ rotation  -CP      Reps 6 8  -CP      Time (Seconds) 6 5  -CP      Exercise 7    Exercise Name 7 hooklying clams  -CP      Cueing 7 Verbal  -CP      Reps 7 10  -CP      Additional Comments Green theraband  -CP      Exercise 8    Exercise Name 8 Supine heel slides for knee AROM and increased hip  flexion/ext, hip abd/add plate slides  -CP      Reps 8 5   each  -CP        User Key  (r) = Recorded By, (t) = Taken By, (c) = Cosigned By    Initials Name Provider Type    CP Corinne E Perkins, PT Physical Therapist                        Manual Rx (last 36 hours)      Manual Treatments       08/15/17 1000          Manual Rx 1    Manual Rx 1 Location LLE  -CP      Manual Rx 1 Type LAD/lat distraction with gait belt/lat distraction with belt with hip flexion.  -CP      Manual Rx 2    Manual Rx 2 Location LLE  -CP      Manual Rx 2 Type MWM into L hip flexion to end range pain free; did not add over pressure today; unable to achieve full AROM pain free. 3x   -CP        User Key  (r) = Recorded By, (t) = Taken By, (c) = Cosigned By    Initials Name Provider Type    CP Corinne E Perkins, PT Physical Therapist                    Therapy Education       08/15/17 1000          Therapy Education    Education Details Pt educated on aquatic options for hip 3 way, standing hamstring curls, and walking in 3-4ft water for mobility. She verbalized compliance with current HEP.   -CP      Given HEP  -CP      Program Reinforced  -CP      How Provided Verbal  -CP      Provided to Patient  -CP      Level of Understanding Verbalized  -CP        User Key  (r) = Recorded By, (t) = Taken By, (c) = Cosigned By    Initials Name Provider Type    CP Corinne E Perkins, PT Physical Therapist                Time Calculation:   Start Time: 1030  Total Timed Code Minutes- PT: 40 minute(s)    Therapy Charges for Today     Code Description Service Date Service Provider Modifiers Qty    71317034505 HC PT THER PROC EA 15 MIN 8/15/2017 Corinne E Perkins, PT GP 3                    Corinne E. Perkins, PT  8/15/2017        Bactrim Counseling:  I discussed with the patient the risks of sulfa antibiotics including but not limited to GI upset, allergic reaction, drug rash, diarrhea, dizziness, photosensitivity, and yeast infections.  Rarely, more serious reactions can occur including but not limited to aplastic anemia, agranulocytosis, methemoglobinemia, blood dyscrasias, liver or kidney failure, lung infiltrates or desquamative/blistering drug rashes.

## 2022-05-02 ENCOUNTER — HOSPITAL ENCOUNTER (OUTPATIENT)
Age: 87
End: 2022-05-02
Payer: MEDICARE

## 2022-05-02 VITALS
DIASTOLIC BLOOD PRESSURE: 85 MMHG | TEMPERATURE: 97.88 F | RESPIRATION RATE: 18 BRPM | OXYGEN SATURATION: 97 % | HEART RATE: 49 BPM | SYSTOLIC BLOOD PRESSURE: 136 MMHG

## 2022-05-02 VITALS — BODY MASS INDEX: 22.1 KG/M2

## 2022-05-02 DIAGNOSIS — M51.16: Primary | ICD-10-CM

## 2022-05-02 DIAGNOSIS — M46.1: ICD-10-CM

## 2022-05-02 PROCEDURE — 99212 OFFICE O/P EST SF 10 MIN: CPT

## 2022-05-02 PROCEDURE — G0463 HOSPITAL OUTPT CLINIC VISIT: HCPCS

## 2022-07-11 RX ORDER — AMLODIPINE BESYLATE 5 MG/1
5 TABLET ORAL DAILY
Qty: 90 TABLET | Refills: 0 | Status: SHIPPED | OUTPATIENT
Start: 2022-07-11 | End: 2022-10-07

## 2022-07-25 ENCOUNTER — TELEPHONE (OUTPATIENT)
Dept: CARDIOLOGY | Facility: CLINIC | Age: 87
End: 2022-07-25

## 2022-07-25 DIAGNOSIS — R00.2 PALPITATIONS: Primary | ICD-10-CM

## 2022-07-25 NOTE — TELEPHONE ENCOUNTER
States that she is having increased palpitations daily.No soa or chest pain reported. Taking sotalol 80 mg twice a day. Please advise.

## 2022-08-15 ENCOUNTER — CLINICAL SUPPORT (OUTPATIENT)
Dept: CARDIOLOGY | Facility: CLINIC | Age: 87
End: 2022-08-15

## 2022-08-15 DIAGNOSIS — I49.1 ATRIAL CONTRACTIONS, PREMATURE: Primary | ICD-10-CM

## 2022-08-15 PROCEDURE — 93000 ELECTROCARDIOGRAM COMPLETE: CPT | Performed by: INTERNAL MEDICINE

## 2022-08-22 ENCOUNTER — OFFICE VISIT (OUTPATIENT)
Dept: CARDIOLOGY | Facility: CLINIC | Age: 87
End: 2022-08-22

## 2022-08-22 VITALS
WEIGHT: 135 LBS | OXYGEN SATURATION: 98 % | HEART RATE: 60 BPM | HEIGHT: 67 IN | SYSTOLIC BLOOD PRESSURE: 148 MMHG | DIASTOLIC BLOOD PRESSURE: 60 MMHG | BODY MASS INDEX: 21.19 KG/M2

## 2022-08-22 DIAGNOSIS — I10 BENIGN HYPERTENSION: ICD-10-CM

## 2022-08-22 DIAGNOSIS — E78.00 HYPERCHOLESTEROLEMIA: ICD-10-CM

## 2022-08-22 DIAGNOSIS — I25.10 CORONARY ARTERY DISEASE INVOLVING NATIVE CORONARY ARTERY OF NATIVE HEART WITHOUT ANGINA PECTORIS: Primary | ICD-10-CM

## 2022-08-22 PROCEDURE — 99214 OFFICE O/P EST MOD 30 MIN: CPT | Performed by: INTERNAL MEDICINE

## 2022-08-22 RX ORDER — SOTALOL HYDROCHLORIDE 80 MG/1
80 TABLET ORAL 2 TIMES DAILY
Qty: 180 TABLET | Refills: 1 | Status: SHIPPED | OUTPATIENT
Start: 2022-08-22 | End: 2023-03-01 | Stop reason: SDUPTHER

## 2022-08-22 NOTE — PROGRESS NOTES
[facility]  Office visit  Yuni Mclain  1935  [phone]    VISIT DATE:  8/22/2022      PCP: Markell Cho MD  [address] [place] [address]    CC:  Chief Complaint   Patient presents with   • Atrial contractions, premature       PROBLEM LIST:  1. Premature atrial contractions and premature ventricular contractions:  a. Recent Zio monitoring confirming PACs, no arrhythmias, no pauses.  2. Hypercholesterolemia.  3. Benign hypertension.  4. Chest pain:  a. Mild to moderate coronary artery disease by catheterization, 2003, medically managed.  b. Recurrent episode since November using nitroglycerin x1.  5. Mild mitral regurgitation by echocardiography.      Previous cardiac studies and procedures:  2003 approximately: PCI with stenting, data insufficient.    July 2019 echo  · Estimated EF appears to be in the range of 66 - 70%.  · Left ventricular wall thickness is consistent with hypertrophy. Sigmoid-shaped ventricular septum is present.  · Left ventricular diastolic dysfunction (grade I a) consistent with impaired relaxation.    March 2021   cardiac catheterization  · 60% proximal LAD, otherwise mild nonobstructive atherosclerosis.  · Patent mid left circumflex stent  · Normal LVEDP  · Normal LVEF  · No aortic stenosis  · No angiographically significant mitral regurgitation  48-hour Holter monitor: The predominant rhythm noted during the testing period was sinus rhythm. Premature atrial contractions occured rarely. Premature ventricular contractions did not appear during monitoring.    ASSESSMENT:   Diagnosis Plan   1. Coronary artery disease involving native coronary artery of native heart without angina pectoris     2. Benign hypertension     3. Hypercholesterolemia         PLAN:  -Premature atrial contractions: Off class Ic agents due to history of CAD.  Continue sotalol to 80 mg p.o. twice daily.  Offered reassurance today.  Encouraged continued regular  "activity.  Recent emotional stress related to the loss of her life partner likely causing a flare in her symptoms and issues with insomnia.  Discussed using over-the-counter low-dose melatonin.    -Hypertension: Goal less than 140/90 mmHg, ideally less than 130/80 mmHg.  Currently with reasonable control.  Continue current medical therapy.    -Dyslipidemia: Continue current medical therapy.    Coronary artery disease: Currently stable asymptomatic.  Remote PCI.  Continue current medical therapy.    Subjective  History of whitecoat hypertension.  Blood pressures predominantly running less than 140/90 mmHg.  She lost her partner of 32 years within the past month which is triggered a flareup in her palpitations.  No cinnamon flip-flop sensations in her chest, predominantly at rest when she is in bed reading at night.  Also having some difficulty getting to sleep over the previous 2 to 3 weeks.  She is compliant with medical therapy.  Still very active without limitation.    PHYSICAL EXAMINATION:  Vitals:    08/22/22 1409   BP: 148/60   BP Location: Left arm   Patient Position: Sitting   Pulse: 60   SpO2: 98%   Weight: 61.2 kg (135 lb)   Height: 168.9 cm (66.5\")     General Appearance:    Alert, cooperative, no distress, appears stated age   Head:    Normocephalic, without obvious abnormality, atraumatic   Eyes:    conjunctiva/corneas clear   Nose:   Nares normal, septum midline, mucosa normal, no drainage   Throat:   Lips, teeth and gums normal   Neck:   Supple, symmetrical, trachea midline, no carotid    bruit or JVD   Lungs:     Clear to auscultation bilaterally, respirations unlabored   Chest Wall:    No tenderness or deformity, I do not appreciate a bruit over the right upper chest.    Heart:    Regular rate and rhythm, S1 and S2 normal, 2/6 early peaking systolic murmur right upper sternal border, rub   or gallop, normal carotid impulse bilaterally without bruit.   Abdomen:     Soft, non-tender   Extremities:   " Extremities normal, atraumatic, no cyanosis or edema   Pulses:   2+ and symmetric all extremities   Skin:   Skin color, texture, turgor normal, no rashes or lesions       Diagnostic Data:  Procedures  Lab Results   Component Value Date    TRIG 87 03/26/2021    HDL 78 (H) 03/26/2021     Lab Results   Component Value Date    GLUCOSE 100 (H) 03/26/2021    BUN 16 03/26/2021    CREATININE 0.70 03/26/2021     03/26/2021    K 3.7 03/26/2021     03/26/2021    CO2 29.0 03/26/2021     Lab Results   Component Value Date    HGBA1C 4.8 07/05/2015     Lab Results   Component Value Date    WBC 5.58 03/26/2021    HGB 13.0 03/26/2021    HCT 40.2 03/26/2021     03/26/2021       Allergies  Allergies   Allergen Reactions   • Albuterol Other (See Comments)     Unknown reactions   • Demerol [Meperidine] Itching   • Statins Myalgia       Current Medications    Current Outpatient Medications:   •  amLODIPine (NORVASC) 5 MG tablet, Take 1 tablet by mouth Daily., Disp: 90 tablet, Rfl: 0  •  aspirin 81 MG EC tablet, Take 81 mg by mouth Daily., Disp: , Rfl:   •  cholecalciferol (VITAMIN D3) 1000 units tablet, Take 5,000 Units by mouth Daily., Disp: , Rfl:   •  ezetimibe (ZETIA) 10 MG tablet, Take 1 tablet by mouth Daily., Disp: 90 tablet, Rfl: 3  •  folic acid (FOLVITE) 400 MCG tablet, Take 400 mcg by mouth Daily., Disp: , Rfl:   •  hydroCHLOROthiazide (HYDRODIURIL) 25 MG tablet, Take 1 tablet by mouth Daily., Disp: 90 tablet, Rfl: 3  •  isosorbide mononitrate (IMDUR) 30 MG 24 hr tablet, Take 1 tablet by mouth Daily., Disp: 90 tablet, Rfl: 3  •  niacin 500 MG tablet, Take 1 tablet by mouth Every Night., Disp: 90 tablet, Rfl: 3  •  sotalol (BETAPACE AF) 80 MG tablet tablet, Take 1 tablet by mouth 2 (Two) Times a Day. Need f/u appt, Disp: 180 tablet, Rfl: 1          ROS  Review of Systems   Cardiovascular: Positive for irregular heartbeat and palpitations. Negative for chest pain and dyspnea on exertion.   Respiratory:  Positive for snoring. Negative for cough.    Genitourinary: Positive for frequency.       SOCIAL HX  Social History     Socioeconomic History   • Marital status:    Tobacco Use   • Smoking status: Former Smoker     Types: Cigarettes     Quit date: 1969     Years since quittin.7   • Smokeless tobacco: Never Used   Vaping Use   • Vaping Use: Never used   Substance and Sexual Activity   • Alcohol use: Yes     Comment: occasionally   • Drug use: No   • Sexual activity: Defer       FAMILY HX  Family History   Problem Relation Age of Onset   • Heart disease Mother    • Cancer Father    • Arthritis Father              Lewis Echols III, MD, FACC

## 2022-10-07 RX ORDER — AMLODIPINE BESYLATE 5 MG/1
TABLET ORAL
Qty: 90 TABLET | Refills: 1 | Status: SHIPPED | OUTPATIENT
Start: 2022-10-07 | End: 2023-03-14

## 2022-12-12 RX ORDER — EZETIMIBE 10 MG/1
TABLET ORAL
Qty: 90 TABLET | Refills: 0 | Status: SHIPPED | OUTPATIENT
Start: 2022-12-12

## 2023-03-01 ENCOUNTER — OFFICE VISIT (OUTPATIENT)
Dept: CARDIOLOGY | Facility: CLINIC | Age: 88
End: 2023-03-01
Payer: MEDICARE

## 2023-03-01 ENCOUNTER — LAB (OUTPATIENT)
Dept: LAB | Facility: HOSPITAL | Age: 88
End: 2023-03-01
Payer: MEDICARE

## 2023-03-01 VITALS
SYSTOLIC BLOOD PRESSURE: 158 MMHG | DIASTOLIC BLOOD PRESSURE: 98 MMHG | RESPIRATION RATE: 18 BRPM | BODY MASS INDEX: 21.97 KG/M2 | WEIGHT: 140 LBS | HEIGHT: 67 IN | HEART RATE: 49 BPM | OXYGEN SATURATION: 96 %

## 2023-03-01 DIAGNOSIS — I25.10 CORONARY ARTERY DISEASE INVOLVING NATIVE CORONARY ARTERY OF NATIVE HEART WITHOUT ANGINA PECTORIS: Primary | ICD-10-CM

## 2023-03-01 DIAGNOSIS — E78.00 HYPERCHOLESTEROLEMIA: ICD-10-CM

## 2023-03-01 DIAGNOSIS — I10 BENIGN HYPERTENSION: ICD-10-CM

## 2023-03-01 DIAGNOSIS — I34.0 NONRHEUMATIC MITRAL VALVE REGURGITATION: ICD-10-CM

## 2023-03-01 DIAGNOSIS — R00.2 PALPITATIONS: ICD-10-CM

## 2023-03-01 LAB
ALBUMIN SERPL-MCNC: 4.3 G/DL (ref 3.5–5.2)
ALBUMIN/GLOB SERPL: 1.5 G/DL
ALP SERPL-CCNC: 65 U/L (ref 39–117)
ALT SERPL W P-5'-P-CCNC: 13 U/L (ref 1–33)
ANION GAP SERPL CALCULATED.3IONS-SCNC: 7.8 MMOL/L (ref 5–15)
AST SERPL-CCNC: 19 U/L (ref 1–32)
BASOPHILS # BLD AUTO: 0.02 10*3/MM3 (ref 0–0.2)
BASOPHILS NFR BLD AUTO: 0.3 % (ref 0–1.5)
BILIRUB SERPL-MCNC: 0.4 MG/DL (ref 0–1.2)
BUN SERPL-MCNC: 11 MG/DL (ref 8–23)
BUN/CREAT SERPL: 12.1 (ref 7–25)
CALCIUM SPEC-SCNC: 10.3 MG/DL (ref 8.6–10.5)
CHLORIDE SERPL-SCNC: 100 MMOL/L (ref 98–107)
CO2 SERPL-SCNC: 28.2 MMOL/L (ref 22–29)
CREAT SERPL-MCNC: 0.91 MG/DL (ref 0.57–1)
DEPRECATED RDW RBC AUTO: 43.7 FL (ref 37–54)
EGFRCR SERPLBLD CKD-EPI 2021: 61.6 ML/MIN/1.73
EOSINOPHIL # BLD AUTO: 0.17 10*3/MM3 (ref 0–0.4)
EOSINOPHIL NFR BLD AUTO: 2.3 % (ref 0.3–6.2)
ERYTHROCYTE [DISTWIDTH] IN BLOOD BY AUTOMATED COUNT: 12.9 % (ref 12.3–15.4)
GLOBULIN UR ELPH-MCNC: 2.8 GM/DL
GLUCOSE SERPL-MCNC: 96 MG/DL (ref 65–99)
HCT VFR BLD AUTO: 40.2 % (ref 34–46.6)
HGB BLD-MCNC: 13 G/DL (ref 12–15.9)
IMM GRANULOCYTES # BLD AUTO: 0.02 10*3/MM3 (ref 0–0.05)
IMM GRANULOCYTES NFR BLD AUTO: 0.3 % (ref 0–0.5)
LYMPHOCYTES # BLD AUTO: 2.33 10*3/MM3 (ref 0.7–3.1)
LYMPHOCYTES NFR BLD AUTO: 32 % (ref 19.6–45.3)
MAGNESIUM SERPL-MCNC: 2.3 MG/DL (ref 1.6–2.4)
MCH RBC QN AUTO: 29.9 PG (ref 26.6–33)
MCHC RBC AUTO-ENTMCNC: 32.3 G/DL (ref 31.5–35.7)
MCV RBC AUTO: 92.4 FL (ref 79–97)
MONOCYTES # BLD AUTO: 0.46 10*3/MM3 (ref 0.1–0.9)
MONOCYTES NFR BLD AUTO: 6.3 % (ref 5–12)
NEUTROPHILS NFR BLD AUTO: 4.28 10*3/MM3 (ref 1.7–7)
NEUTROPHILS NFR BLD AUTO: 58.8 % (ref 42.7–76)
NRBC BLD AUTO-RTO: 0 /100 WBC (ref 0–0.2)
PLATELET # BLD AUTO: 216 10*3/MM3 (ref 140–450)
PMV BLD AUTO: 10.3 FL (ref 6–12)
POTASSIUM SERPL-SCNC: 4.8 MMOL/L (ref 3.5–5.2)
PROT SERPL-MCNC: 7.1 G/DL (ref 6–8.5)
RBC # BLD AUTO: 4.35 10*6/MM3 (ref 3.77–5.28)
SODIUM SERPL-SCNC: 136 MMOL/L (ref 136–145)
TSH SERPL DL<=0.05 MIU/L-ACNC: 3.92 UIU/ML (ref 0.27–4.2)
WBC NRBC COR # BLD: 7.28 10*3/MM3 (ref 3.4–10.8)

## 2023-03-01 PROCEDURE — 80053 COMPREHEN METABOLIC PANEL: CPT | Performed by: INTERNAL MEDICINE

## 2023-03-01 PROCEDURE — 36415 COLL VENOUS BLD VENIPUNCTURE: CPT | Performed by: INTERNAL MEDICINE

## 2023-03-01 PROCEDURE — 84443 ASSAY THYROID STIM HORMONE: CPT | Performed by: INTERNAL MEDICINE

## 2023-03-01 PROCEDURE — 85025 COMPLETE CBC W/AUTO DIFF WBC: CPT | Performed by: INTERNAL MEDICINE

## 2023-03-01 PROCEDURE — 83735 ASSAY OF MAGNESIUM: CPT | Performed by: INTERNAL MEDICINE

## 2023-03-01 PROCEDURE — 99214 OFFICE O/P EST MOD 30 MIN: CPT | Performed by: INTERNAL MEDICINE

## 2023-03-01 RX ORDER — ESCITALOPRAM OXALATE 5 MG/1
1 TABLET ORAL DAILY
COMMUNITY
Start: 2023-02-07

## 2023-03-01 RX ORDER — SOTALOL HYDROCHLORIDE 80 MG/1
80 TABLET ORAL 2 TIMES DAILY
Qty: 180 TABLET | Refills: 1 | Status: SHIPPED | OUTPATIENT
Start: 2023-03-01

## 2023-03-01 NOTE — PROGRESS NOTES
[facility]  Office visit  Yuni Mclain  1935  [phone]    VISIT DATE:  3/1/2023      PCP: Javon Santamaria MD  [address] [facility] [address]  [place] [address]    CC:  Chief Complaint   Patient presents with   • Follow-up     Coronary artery disease involving native coronary artery of native heart without angina pectoris       PROBLEM LIST:  1. Premature atrial contractions and premature ventricular contractions:  a. Recent Zio monitoring confirming PACs, no arrhythmias, no pauses.  2. Hypercholesterolemia.  3. Benign hypertension.  4. Chest pain:  a. Mild to moderate coronary artery disease by catheterization, 2003, medically managed.  b. Recurrent episode since November using nitroglycerin x1.  5. Mild mitral regurgitation by echocardiography.      Previous cardiac studies and procedures:  2003 approximately: PCI with stenting, data insufficient.    July 2019 echo  · Estimated EF appears to be in the range of 66 - 70%.  · Left ventricular wall thickness is consistent with hypertrophy. Sigmoid-shaped ventricular septum is present.  · Left ventricular diastolic dysfunction (grade I a) consistent with impaired relaxation.    March 2021   cardiac catheterization  · 60% proximal LAD, otherwise mild nonobstructive atherosclerosis.  · Patent mid left circumflex stent  · Normal LVEDP  · Normal LVEF  · No aortic stenosis  · No angiographically significant mitral regurgitation  48-hour Holter monitor: The predominant rhythm noted during the testing period was sinus rhythm. Premature atrial contractions occured rarely. Premature ventricular contractions did not appear during monitoring.    ASSESSMENT:   Diagnosis Plan   1. Coronary artery disease involving native coronary artery of native heart without angina pectoris        2. Hypercholesterolemia        3. Nonrheumatic mitral valve regurgitation        4. Benign hypertension            PLAN:  -Premature atrial contractions: Off  "class Ic agents due to history of CAD.  Continue sotalol to 80 mg p.o. twice daily.  Symptoms have increased in are beginning to affect her quality of life.  48-hour Holter monitor pending.  Discussed switching over to an alternative agent.    -Hypertension: Goal less than 140/90 mmHg, ideally less than 130/80 mmHg.  Currently with reasonable control.  Continue current medical therapy.    -Dyslipidemia: Continue current medical therapy.    Coronary artery disease: Currently stable asymptomatic.  Remote PCI.  Continue current medical therapy.    Subjective  History of whitecoat hypertension.  Blood pressures predominantly running less than 140/90 mmHg.  She lost her partner of 32 years in the summer 2022.  She has been experiencing daily palpitations which have become more frequent.    PHYSICAL EXAMINATION:  Vitals:    03/01/23 1021   BP: 158/98   BP Location: Left arm   Patient Position: Sitting   Cuff Size: Adult   Pulse: (!) 49   Resp: 18   SpO2: 96%   Weight: 63.5 kg (140 lb)   Height: 168.9 cm (66.5\")     General Appearance:    Alert, cooperative, no distress, appears stated age   Head:    Normocephalic, without obvious abnormality, atraumatic   Eyes:    conjunctiva/corneas clear   Nose:   Nares normal, septum midline, mucosa normal, no drainage   Throat:   Lips, teeth and gums normal   Neck:   Supple, symmetrical, trachea midline, no carotid    bruit or JVD   Lungs:     Clear to auscultation bilaterally, respirations unlabored   Chest Wall:    No tenderness or deformity, I do not appreciate a bruit over the right upper chest.    Heart:    Regular rate and rhythm, S1 and S2 normal, 2/6 early peaking systolic murmur right upper sternal border, rub   or gallop, normal carotid impulse bilaterally without bruit.   Abdomen:     Soft, non-tender   Extremities:   Extremities normal, atraumatic, no cyanosis or edema   Pulses:   2+ and symmetric all extremities   Skin:   Skin color, texture, turgor normal, no rashes or " lesions       Diagnostic Data:  Procedures  Lab Results   Component Value Date    TRIG 87 03/26/2021    HDL 78 (H) 03/26/2021     Lab Results   Component Value Date    GLUCOSE 100 (H) 03/26/2021    BUN 16 03/26/2021    CREATININE 0.70 03/26/2021     03/26/2021    K 3.7 03/26/2021     03/26/2021    CO2 29.0 03/26/2021     Lab Results   Component Value Date    HGBA1C 4.8 07/05/2015     Lab Results   Component Value Date    WBC 5.58 03/26/2021    HGB 13.0 03/26/2021    HCT 40.2 03/26/2021     03/26/2021       Allergies  Allergies   Allergen Reactions   • Albuterol Rash   • Demerol [Meperidine] Itching   • Statins Itching and Myalgia       Current Medications    Current Outpatient Medications:   •  amLODIPine (NORVASC) 5 MG tablet, Take 1 tablet by mouth once daily, Disp: 90 tablet, Rfl: 1  •  escitalopram (LEXAPRO) 5 MG tablet, Take 1 tablet by mouth Daily., Disp: , Rfl:   •  ezetimibe (ZETIA) 10 MG tablet, Take 1 tablet by mouth once daily, Disp: 90 tablet, Rfl: 0  •  folic acid (FOLVITE) 400 MCG tablet, Take 1 tablet by mouth Daily., Disp: , Rfl:   •  hydroCHLOROthiazide (HYDRODIURIL) 25 MG tablet, Take 1 tablet by mouth Daily., Disp: 90 tablet, Rfl: 3  •  isosorbide mononitrate (IMDUR) 30 MG 24 hr tablet, Take 1 tablet by mouth Daily., Disp: 90 tablet, Rfl: 3  •  niacin 500 MG tablet, Take 1 tablet by mouth Every Night., Disp: 90 tablet, Rfl: 3  •  sotalol (BETAPACE AF) 80 MG tablet tablet, Take 1 tablet by mouth 2 (Two) Times a Day. Need f/u appt, Disp: 180 tablet, Rfl: 1  •  aspirin 81 MG EC tablet, Take 1 tablet by mouth Daily., Disp: , Rfl:           ROS  Review of Systems   Cardiovascular: Positive for irregular heartbeat and palpitations. Negative for chest pain and dyspnea on exertion.   Respiratory: Positive for snoring. Negative for cough.    Genitourinary: Positive for frequency.       SOCIAL HX  Social History     Socioeconomic History   • Marital status:    Tobacco Use   •  Smoking status: Former     Types: Cigarettes     Quit date: 1969     Years since quittin.2   • Smokeless tobacco: Never   Vaping Use   • Vaping Use: Never used   Substance and Sexual Activity   • Alcohol use: Yes     Comment: occasionally   • Drug use: No   • Sexual activity: Defer       FAMILY HX  Family History   Problem Relation Age of Onset   • Heart disease Mother    • Cancer Father    • Arthritis Father              Lewis Echols III, MD, FACC

## 2023-03-02 ENCOUNTER — TELEPHONE (OUTPATIENT)
Dept: CARDIOLOGY | Facility: CLINIC | Age: 88
End: 2023-03-02
Payer: MEDICARE

## 2023-03-14 RX ORDER — AMLODIPINE BESYLATE 5 MG/1
TABLET ORAL
Qty: 90 TABLET | Refills: 1 | Status: SHIPPED | OUTPATIENT
Start: 2023-03-14

## 2023-03-14 RX ORDER — ISOSORBIDE MONONITRATE 30 MG/1
TABLET, EXTENDED RELEASE ORAL
Qty: 90 TABLET | Refills: 1 | Status: SHIPPED | OUTPATIENT
Start: 2023-03-14

## 2023-04-13 ENCOUNTER — HOSPITAL ENCOUNTER (INPATIENT)
Dept: HOSPITAL 22 - ER | Age: 88
LOS: 4 days | Discharge: NURSING, REHAB OR LONG-TERM CARE | DRG: 565 | End: 2023-04-17
Payer: MEDICARE

## 2023-04-13 VITALS
DIASTOLIC BLOOD PRESSURE: 54 MMHG | HEART RATE: 73 BPM | RESPIRATION RATE: 18 BRPM | OXYGEN SATURATION: 92 % | SYSTOLIC BLOOD PRESSURE: 149 MMHG | TEMPERATURE: 100.22 F

## 2023-04-13 VITALS
BODY MASS INDEX: 23.3 KG/M2 | DIASTOLIC BLOOD PRESSURE: 86 MMHG | HEART RATE: 70 BPM | OXYGEN SATURATION: 94 % | BODY MASS INDEX: 23 KG/M2 | TEMPERATURE: 98.06 F | RESPIRATION RATE: 18 BRPM | BODY MASS INDEX: 28.3 KG/M2 | BODY MASS INDEX: 22 KG/M2 | BODY MASS INDEX: 24.2 KG/M2 | BODY MASS INDEX: 23.9 KG/M2 | SYSTOLIC BLOOD PRESSURE: 202 MMHG | BODY MASS INDEX: 24 KG/M2

## 2023-04-13 VITALS
DIASTOLIC BLOOD PRESSURE: 72 MMHG | SYSTOLIC BLOOD PRESSURE: 158 MMHG | TEMPERATURE: 100.1 F | HEART RATE: 69 BPM | RESPIRATION RATE: 18 BRPM | OXYGEN SATURATION: 92 %

## 2023-04-13 VITALS
HEART RATE: 78 BPM | DIASTOLIC BLOOD PRESSURE: 77 MMHG | TEMPERATURE: 98.24 F | OXYGEN SATURATION: 98 % | RESPIRATION RATE: 17 BRPM | SYSTOLIC BLOOD PRESSURE: 166 MMHG

## 2023-04-13 VITALS — HEART RATE: 68 BPM | OXYGEN SATURATION: 94 % | SYSTOLIC BLOOD PRESSURE: 180 MMHG | DIASTOLIC BLOOD PRESSURE: 84 MMHG

## 2023-04-13 VITALS — DIASTOLIC BLOOD PRESSURE: 77 MMHG | OXYGEN SATURATION: 92 % | SYSTOLIC BLOOD PRESSURE: 167 MMHG

## 2023-04-13 VITALS — DIASTOLIC BLOOD PRESSURE: 104 MMHG | OXYGEN SATURATION: 92 % | HEART RATE: 69 BPM | SYSTOLIC BLOOD PRESSURE: 213 MMHG

## 2023-04-13 VITALS — SYSTOLIC BLOOD PRESSURE: 187 MMHG | DIASTOLIC BLOOD PRESSURE: 81 MMHG | OXYGEN SATURATION: 94 % | HEART RATE: 68 BPM

## 2023-04-13 VITALS — DIASTOLIC BLOOD PRESSURE: 86 MMHG | SYSTOLIC BLOOD PRESSURE: 202 MMHG | OXYGEN SATURATION: 92 % | HEART RATE: 69 BPM

## 2023-04-13 VITALS — DIASTOLIC BLOOD PRESSURE: 79 MMHG | SYSTOLIC BLOOD PRESSURE: 174 MMHG | OXYGEN SATURATION: 95 % | HEART RATE: 66 BPM

## 2023-04-13 VITALS — DIASTOLIC BLOOD PRESSURE: 78 MMHG | SYSTOLIC BLOOD PRESSURE: 156 MMHG | OXYGEN SATURATION: 93 % | HEART RATE: 69 BPM

## 2023-04-13 DIAGNOSIS — F41.9: ICD-10-CM
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
## 2023-04-13 DIAGNOSIS — I10: ICD-10-CM

## 2023-04-13 DIAGNOSIS — M25.532: ICD-10-CM

## 2023-04-13 DIAGNOSIS — F32.A: ICD-10-CM

## 2023-04-13 DIAGNOSIS — E78.5: ICD-10-CM

## 2023-04-13 DIAGNOSIS — R55: ICD-10-CM

## 2023-04-13 DIAGNOSIS — Z95.5: ICD-10-CM

## 2023-04-13 DIAGNOSIS — I25.10: ICD-10-CM
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
## 2023-04-13 DIAGNOSIS — I25.2: ICD-10-CM

## 2023-04-13 DIAGNOSIS — G93.40: ICD-10-CM

## 2023-04-13 DIAGNOSIS — T79.6XXA: Primary | ICD-10-CM

## 2023-04-13 LAB
ALBUMIN LEVEL: 4.7 G/DL (ref 3.5–5)
ALBUMIN/GLOB SERPL: 1.4 {RATIO} (ref 1.1–1.8)
ALP ISO SERPL-ACNC: 74 U/L (ref 38–126)
ALT SERPLBLD-CCNC: 32 U/L (ref 12–78)
ANION GAP SERPL CALC-SCNC: 8 MEQ/L (ref 5–15)
AST SERPL QL: 76 U/L (ref 14–36)
BACTERIA URNS QL MICRO: (no result) /LPF
BILIRUBIN,TOTAL: 0.8 MG/DL (ref 0.2–1.3)
BUN SERPL-MCNC: 17 MG/DL (ref 7–17)
CALCIUM SPEC-MCNC: 9.4 MG/DL (ref 8.4–10.2)
CHLORIDE SPEC-SCNC: 97 MMOL/L (ref 98–107)
CK SERPL-CCNC: 1243 U/L (ref 30–135)
CO2 SERPL-SCNC: 30 MMOL/L (ref 22–30)
COLOR UR: YELLOW
CREAT BLD-SCNC: 0.6 MG/DL (ref 0.52–1.04)
CREATININE CLEARANCE ESTIMATED: 48 ML/MIN (ref 50–200)
ESTIMATED GLOMERULAR FILT RATE: 95 ML/MIN (ref 60–?)
GFR (AFRICAN AMERICAN): 114 ML/MIN (ref 60–?)
GLOBULIN SER CALC-MCNC: 3.3 G/DL (ref 1.3–3.2)
GLUCOSE: 122 MG/DL (ref 74–100)
HCT VFR BLD CALC: 44 % (ref 37–47)
HGB BLD-MCNC: 14.2 G/DL (ref 12.2–16.2)
KETONES UR STRIP.AUTO-MCNC: (no result) MG/DL
MCHC RBC-ENTMCNC: 32.2 G/DL (ref 31.8–35.4)
MCV RBC: 94.1 FL (ref 81–99)
MEAN CORPUSCULAR HEMOGLOBIN: 30.3 PG (ref 27–31.2)
MICRO URNS: (no result)
PH UR: 6.5 [PH] (ref 5–8.5)
PLATELET # BLD: 239 K/MM3 (ref 142–424)
POTASSIUM: 4 MMOL/L (ref 3.5–5.1)
PROT SERPL-MCNC: 8 G/DL (ref 6.3–8.2)
PROT UR STRIP-MCNC: (no result) MG/DL
RBC # BLD AUTO: 4.68 M/MM3 (ref 4.2–5.4)
RBC #/AREA URNS HPF: (no result) #/HPF (ref 0–3)
SODIUM SPEC-SCNC: 131 MMOL/L (ref 136–145)
SP GR UR: 1.02 (ref 1–1.03)
TROPONIN I: 0.02 NG/ML (ref 0–0.03)
UROBILINOGEN UR QL: 0.2 EU/DL
WBC # BLD AUTO: 11.6 K/MM3 (ref 4.8–10.8)

## 2023-04-13 PROCEDURE — 80048 BASIC METABOLIC PNL TOTAL CA: CPT

## 2023-04-13 PROCEDURE — 70450 CT HEAD/BRAIN W/O DYE: CPT

## 2023-04-13 PROCEDURE — 83735 ASSAY OF MAGNESIUM: CPT

## 2023-04-13 PROCEDURE — 80053 COMPREHEN METABOLIC PANEL: CPT

## 2023-04-13 PROCEDURE — 73060 X-RAY EXAM OF HUMERUS: CPT

## 2023-04-13 PROCEDURE — 71250 CT THORAX DX C-: CPT

## 2023-04-13 PROCEDURE — 93005 ELECTROCARDIOGRAM TRACING: CPT

## 2023-04-13 PROCEDURE — 36415 COLL VENOUS BLD VENIPUNCTURE: CPT

## 2023-04-13 PROCEDURE — 73110 X-RAY EXAM OF WRIST: CPT

## 2023-04-13 PROCEDURE — 73120 X-RAY EXAM OF HAND: CPT

## 2023-04-13 PROCEDURE — 99285 EMERGENCY DEPT VISIT HI MDM: CPT

## 2023-04-13 PROCEDURE — C9803 HOPD COVID-19 SPEC COLLECT: HCPCS

## 2023-04-13 PROCEDURE — 72192 CT PELVIS W/O DYE: CPT

## 2023-04-13 PROCEDURE — 84443 ASSAY THYROID STIM HORMONE: CPT

## 2023-04-13 PROCEDURE — 87040 BLOOD CULTURE FOR BACTERIA: CPT

## 2023-04-13 PROCEDURE — 87086 URINE CULTURE/COLONY COUNT: CPT

## 2023-04-13 PROCEDURE — 87186 SC STD MICRODIL/AGAR DIL: CPT

## 2023-04-13 PROCEDURE — 97166 OT EVAL MOD COMPLEX 45 MIN: CPT

## 2023-04-13 PROCEDURE — 84100 ASSAY OF PHOSPHORUS: CPT

## 2023-04-13 PROCEDURE — U0003 INFECTIOUS AGENT DETECTION BY NUCLEIC ACID (DNA OR RNA); SEVERE ACUTE RESPIRATORY SYNDROME CORONAVIRUS 2 (SARS-COV-2) (CORONAVIRUS DISEASE [COVID-19]), AMPLIFIED PROBE TECHNIQUE, MAKING USE OF HIGH THROUGHPUT TECHNOLOGIES AS DESCRIBED BY CMS-2020-01-R: HCPCS

## 2023-04-13 PROCEDURE — U0005 INFEC AGEN DETEC AMPLI PROBE: HCPCS

## 2023-04-13 PROCEDURE — 87088 URINE BACTERIA CULTURE: CPT

## 2023-04-13 PROCEDURE — 81001 URINALYSIS AUTO W/SCOPE: CPT

## 2023-04-13 PROCEDURE — 74176 CT ABD & PELVIS W/O CONTRAST: CPT

## 2023-04-13 PROCEDURE — 97530 THERAPEUTIC ACTIVITIES: CPT

## 2023-04-13 PROCEDURE — 72125 CT NECK SPINE W/O DYE: CPT

## 2023-04-13 PROCEDURE — 93306 TTE W/DOPPLER COMPLETE: CPT

## 2023-04-13 PROCEDURE — 72131 CT LUMBAR SPINE W/O DYE: CPT

## 2023-04-13 PROCEDURE — 73070 X-RAY EXAM OF ELBOW: CPT

## 2023-04-13 PROCEDURE — 84484 ASSAY OF TROPONIN QUANT: CPT

## 2023-04-13 PROCEDURE — 73030 X-RAY EXAM OF SHOULDER: CPT

## 2023-04-13 PROCEDURE — 72128 CT CHEST SPINE W/O DYE: CPT

## 2023-04-13 PROCEDURE — 85025 COMPLETE CBC W/AUTO DIFF WBC: CPT

## 2023-04-13 PROCEDURE — 97162 PT EVAL MOD COMPLEX 30 MIN: CPT

## 2023-04-13 PROCEDURE — 82550 ASSAY OF CK (CPK): CPT

## 2023-04-13 NOTE — PC.NURSE
updated pt and family that  report was being called to the nurse on medsurg and it shouldn't be much longer before they are moved to her room on second floor

## 2023-04-14 VITALS
TEMPERATURE: 99.14 F | OXYGEN SATURATION: 94 % | SYSTOLIC BLOOD PRESSURE: 121 MMHG | RESPIRATION RATE: 18 BRPM | HEART RATE: 60 BPM | DIASTOLIC BLOOD PRESSURE: 58 MMHG

## 2023-04-14 VITALS
OXYGEN SATURATION: 92 % | DIASTOLIC BLOOD PRESSURE: 57 MMHG | TEMPERATURE: 98.2 F | HEART RATE: 61 BPM | RESPIRATION RATE: 18 BRPM | SYSTOLIC BLOOD PRESSURE: 144 MMHG

## 2023-04-14 VITALS
TEMPERATURE: 98.24 F | DIASTOLIC BLOOD PRESSURE: 64 MMHG | RESPIRATION RATE: 18 BRPM | SYSTOLIC BLOOD PRESSURE: 152 MMHG | OXYGEN SATURATION: 94 % | HEART RATE: 59 BPM

## 2023-04-14 VITALS
DIASTOLIC BLOOD PRESSURE: 74 MMHG | TEMPERATURE: 98 F | RESPIRATION RATE: 18 BRPM | OXYGEN SATURATION: 97 % | HEART RATE: 70 BPM | SYSTOLIC BLOOD PRESSURE: 156 MMHG

## 2023-04-14 VITALS
DIASTOLIC BLOOD PRESSURE: 60 MMHG | RESPIRATION RATE: 18 BRPM | HEART RATE: 92 BPM | OXYGEN SATURATION: 96 % | TEMPERATURE: 98.7 F | SYSTOLIC BLOOD PRESSURE: 151 MMHG

## 2023-04-14 VITALS — HEART RATE: 61 BPM

## 2023-04-14 VITALS — OXYGEN SATURATION: 94 %

## 2023-04-14 VITALS — HEART RATE: 60 BPM

## 2023-04-14 LAB
ALBUMIN LEVEL: 3.5 G/DL (ref 3.5–5)
ALBUMIN/GLOB SERPL: 1.3 {RATIO} (ref 1.1–1.8)
ALP ISO SERPL-ACNC: 55 U/L (ref 38–126)
ALT SERPLBLD-CCNC: 26 U/L (ref 12–78)
ANION GAP SERPL CALC-SCNC: 8.6 MEQ/L (ref 5–15)
AST SERPL QL: 55 U/L (ref 14–36)
BILIRUBIN,TOTAL: 0.7 MG/DL (ref 0.2–1.3)
BUN SERPL-MCNC: 11 MG/DL (ref 7–17)
CALCIUM SPEC-MCNC: 8.5 MG/DL (ref 8.4–10.2)
CHLORIDE SPEC-SCNC: 98 MMOL/L (ref 98–107)
CK SERPL-CCNC: 713 U/L (ref 30–135)
CO2 SERPL-SCNC: 30 MMOL/L (ref 22–30)
CREAT BLD-SCNC: 0.5 MG/DL (ref 0.52–1.04)
CREATININE CLEARANCE ESTIMATED: 41 ML/MIN (ref 50–200)
ESTIMATED GLOMERULAR FILT RATE: 117 ML/MIN (ref 60–?)
GFR (AFRICAN AMERICAN): 141 ML/MIN (ref 60–?)
GLOBULIN SER CALC-MCNC: 2.6 G/DL (ref 1.3–3.2)
GLUCOSE: 103 MG/DL (ref 74–100)
HCT VFR BLD CALC: 40.1 % (ref 37–47)
HGB BLD-MCNC: 12.9 G/DL (ref 12.2–16.2)
MAGNESIUM: 1.9 MG/DL (ref 1.6–2.3)
MCHC RBC-ENTMCNC: 32.2 G/DL (ref 31.8–35.4)
MCV RBC: 94.4 FL (ref 81–99)
MEAN CORPUSCULAR HEMOGLOBIN: 30.4 PG (ref 27–31.2)
PHOSPHOROUS: 2.5 MG/DL (ref 2.5–4.5)
PLATELET # BLD: 201 K/MM3 (ref 142–424)
POTASSIUM: 3.6 MMOL/L (ref 3.5–5.1)
PROT SERPL-MCNC: 6.1 G/DL (ref 6.3–8.2)
RBC # BLD AUTO: 4.25 M/MM3 (ref 4.2–5.4)
SODIUM SPEC-SCNC: 133 MMOL/L (ref 136–145)
TSH SERPL-ACNC: 3.42 UIU/ML (ref 0.47–4.68)
WBC # BLD AUTO: 9.5 K/MM3 (ref 4.8–10.8)

## 2023-04-14 NOTE — PC.NURSE
courtesy tech note; 0300 rounded on pt, pt denied need for drink, need to void, and need to reposition in bed. call light within reach, no further requests at this time.

## 2023-04-14 NOTE — PC.NURSE
courtesy tech note; rounded on pt, brought ice water at pt request, pt denied need to use restroom and need to reposition. call light within reach, no further requests at this time.

## 2023-04-14 NOTE — PC.NURSE
pt moaning in pain, complains of neck and back pain and left elbow and shoulder, pt crying in pain, beba musa to inform and states she will be up to assess pt soon. no new orders given at this time.

## 2023-04-14 NOTE — PC.NURSE
beba musa called for pt complaints pain, states pain is back at a 7, pt complains mainly pain in left arm shoulder and neck down back. note order entered for morphine per provider.

## 2023-04-14 NOTE — PC.NURSE
A&OX4. LUNG SOUNDS DIMINISHED THROUGHOUT. NO COUGH NOTED. RESPIRATIONS REGULAR AND UNLABORED. HAND  EQUAL. +2 PULSES NOTED THROUGHOUT. HEART RATE REGULAR. NO EDEMA NOTED. ABDOMEN SOFT AND NONTENDER. ACTIVE BOWEL SOUNDS HEARD THROUGHOUT. PURWICK

## 2023-04-15 VITALS
RESPIRATION RATE: 18 BRPM | OXYGEN SATURATION: 97 % | DIASTOLIC BLOOD PRESSURE: 53 MMHG | HEART RATE: 60 BPM | SYSTOLIC BLOOD PRESSURE: 113 MMHG | TEMPERATURE: 98.78 F

## 2023-04-15 VITALS
SYSTOLIC BLOOD PRESSURE: 128 MMHG | HEART RATE: 51 BPM | DIASTOLIC BLOOD PRESSURE: 60 MMHG | RESPIRATION RATE: 17 BRPM | OXYGEN SATURATION: 96 % | TEMPERATURE: 97.88 F

## 2023-04-15 VITALS
RESPIRATION RATE: 20 BRPM | TEMPERATURE: 98.96 F | SYSTOLIC BLOOD PRESSURE: 145 MMHG | HEART RATE: 70 BPM | OXYGEN SATURATION: 95 % | DIASTOLIC BLOOD PRESSURE: 76 MMHG

## 2023-04-15 VITALS
TEMPERATURE: 98.2 F | OXYGEN SATURATION: 94 % | SYSTOLIC BLOOD PRESSURE: 171 MMHG | HEART RATE: 63 BPM | DIASTOLIC BLOOD PRESSURE: 80 MMHG | RESPIRATION RATE: 16 BRPM

## 2023-04-15 VITALS
DIASTOLIC BLOOD PRESSURE: 50 MMHG | HEART RATE: 55 BPM | TEMPERATURE: 98.9 F | SYSTOLIC BLOOD PRESSURE: 111 MMHG | RESPIRATION RATE: 16 BRPM | OXYGEN SATURATION: 95 %

## 2023-04-15 VITALS
OXYGEN SATURATION: 95 % | TEMPERATURE: 98.1 F | SYSTOLIC BLOOD PRESSURE: 159 MMHG | DIASTOLIC BLOOD PRESSURE: 89 MMHG | HEART RATE: 51 BPM | RESPIRATION RATE: 18 BRPM

## 2023-04-15 VITALS — HEART RATE: 50 BPM

## 2023-04-15 VITALS — HEART RATE: 56 BPM

## 2023-04-15 VITALS — HEART RATE: 60 BPM

## 2023-04-15 LAB
ALBUMIN LEVEL: 3 G/DL (ref 3.5–5)
ALBUMIN/GLOB SERPL: 1.2 {RATIO} (ref 1.1–1.8)
ALP ISO SERPL-ACNC: 52 U/L (ref 38–126)
ALT SERPLBLD-CCNC: 24 U/L (ref 12–78)
ANION GAP SERPL CALC-SCNC: 8 MEQ/L (ref 5–15)
AST SERPL QL: 41 U/L (ref 14–36)
BILIRUBIN,TOTAL: 0.4 MG/DL (ref 0.2–1.3)
BUN SERPL-MCNC: 10 MG/DL (ref 7–17)
CALCIUM SPEC-MCNC: 8.6 MG/DL (ref 8.4–10.2)
CHLORIDE SPEC-SCNC: 99 MMOL/L (ref 98–107)
CO2 SERPL-SCNC: 31 MMOL/L (ref 22–30)
CREAT BLD-SCNC: 0.5 MG/DL (ref 0.52–1.04)
CREATININE CLEARANCE ESTIMATED: 42 ML/MIN (ref 50–200)
ESTIMATED GLOMERULAR FILT RATE: 117 ML/MIN (ref 60–?)
GFR (AFRICAN AMERICAN): 141 ML/MIN (ref 60–?)
GLOBULIN SER CALC-MCNC: 2.5 G/DL (ref 1.3–3.2)
GLUCOSE: 128 MG/DL (ref 74–100)
POTASSIUM: 4 MMOL/L (ref 3.5–5.1)
PROT SERPL-MCNC: 5.5 G/DL (ref 6.3–8.2)
SODIUM SPEC-SCNC: 134 MMOL/L (ref 136–145)

## 2023-04-15 NOTE — PC.NURSE
PT IS RESTING IN BED. ALERT AND ORIENTED X4. EATING AND DRINKING WELL. LUNG SOUNDS CLEAR. ABDOMEN SOFT/NON TENDER WITH ACTIVE BOWEL SOUNDS. PT PARTICIPATED WITH PHYSICAL THERAPY. TOLERATED SITTING UP IN THE CHAIR FOR A FEW HOURS THIS AFTERNOON. PT'S

## 2023-04-16 VITALS
TEMPERATURE: 98.2 F | RESPIRATION RATE: 17 BRPM | OXYGEN SATURATION: 95 % | DIASTOLIC BLOOD PRESSURE: 85 MMHG | SYSTOLIC BLOOD PRESSURE: 192 MMHG | HEART RATE: 50 BPM

## 2023-04-16 VITALS
TEMPERATURE: 97.88 F | OXYGEN SATURATION: 96 % | HEART RATE: 50 BPM | RESPIRATION RATE: 16 BRPM | SYSTOLIC BLOOD PRESSURE: 92 MMHG | DIASTOLIC BLOOD PRESSURE: 64 MMHG

## 2023-04-16 VITALS
HEART RATE: 62 BPM | OXYGEN SATURATION: 94 % | SYSTOLIC BLOOD PRESSURE: 159 MMHG | RESPIRATION RATE: 16 BRPM | DIASTOLIC BLOOD PRESSURE: 76 MMHG | TEMPERATURE: 98.3 F

## 2023-04-16 VITALS — HEART RATE: 59 BPM | DIASTOLIC BLOOD PRESSURE: 76 MMHG | SYSTOLIC BLOOD PRESSURE: 191 MMHG

## 2023-04-16 VITALS
DIASTOLIC BLOOD PRESSURE: 58 MMHG | RESPIRATION RATE: 18 BRPM | HEART RATE: 50 BPM | SYSTOLIC BLOOD PRESSURE: 133 MMHG | TEMPERATURE: 97.88 F | OXYGEN SATURATION: 97 %

## 2023-04-16 VITALS
OXYGEN SATURATION: 96 % | RESPIRATION RATE: 16 BRPM | SYSTOLIC BLOOD PRESSURE: 175 MMHG | HEART RATE: 60 BPM | TEMPERATURE: 98.1 F | DIASTOLIC BLOOD PRESSURE: 64 MMHG

## 2023-04-16 VITALS
SYSTOLIC BLOOD PRESSURE: 122 MMHG | HEART RATE: 52 BPM | DIASTOLIC BLOOD PRESSURE: 51 MMHG | RESPIRATION RATE: 18 BRPM | TEMPERATURE: 98.1 F | OXYGEN SATURATION: 96 %

## 2023-04-16 VITALS
OXYGEN SATURATION: 96 % | TEMPERATURE: 98.2 F | HEART RATE: 58 BPM | DIASTOLIC BLOOD PRESSURE: 87 MMHG | RESPIRATION RATE: 17 BRPM | SYSTOLIC BLOOD PRESSURE: 190 MMHG

## 2023-04-16 VITALS — HEART RATE: 60 BPM

## 2023-04-16 LAB
ALBUMIN LEVEL: 3.4 G/DL (ref 3.5–5)
ALBUMIN/GLOB SERPL: 1.2 {RATIO} (ref 1.1–1.8)
ALP ISO SERPL-ACNC: 59 U/L (ref 38–126)
ALT SERPLBLD-CCNC: 25 U/L (ref 12–78)
ANION GAP SERPL CALC-SCNC: 9 MEQ/L (ref 5–15)
AST SERPL QL: 39 U/L (ref 14–36)
BILIRUBIN,TOTAL: 0.5 MG/DL (ref 0.2–1.3)
BUN SERPL-MCNC: 9 MG/DL (ref 7–17)
CALCIUM SPEC-MCNC: 8.8 MG/DL (ref 8.4–10.2)
CHLORIDE SPEC-SCNC: 101 MMOL/L (ref 98–107)
CO2 SERPL-SCNC: 29 MMOL/L (ref 22–30)
CREAT BLD-SCNC: 0.5 MG/DL (ref 0.52–1.04)
CREATININE CLEARANCE ESTIMATED: 43 ML/MIN (ref 50–200)
ESTIMATED GLOMERULAR FILT RATE: 117 ML/MIN (ref 60–?)
GFR (AFRICAN AMERICAN): 141 ML/MIN (ref 60–?)
GLOBULIN SER CALC-MCNC: 2.8 G/DL (ref 1.3–3.2)
GLUCOSE: 104 MG/DL (ref 74–100)
POTASSIUM: 4 MMOL/L (ref 3.5–5.1)
PROT SERPL-MCNC: 6.2 G/DL (ref 6.3–8.2)
SODIUM SPEC-SCNC: 135 MMOL/L (ref 136–145)

## 2023-04-16 NOTE — EXP.ACUTE.PN
Rest.  Ice packs to swollen area at least four times daily for 15 minute intervals.  Take prednisone daily (preferably in the mornings) for the next 3 days; we gave you a dose today.  Take OTC benadryl as needed for swelling.  Return for new/worsening symptoms.   Subjective

## 2023-04-16 NOTE — PC.NURSE
notified LISA Bryant of the difference in BP taken in opposite arms, Left arm manual BP 92/64, right arm manual /84, no new orders at this time

## 2023-04-17 VITALS
RESPIRATION RATE: 18 BRPM | TEMPERATURE: 97.88 F | SYSTOLIC BLOOD PRESSURE: 181 MMHG | DIASTOLIC BLOOD PRESSURE: 76 MMHG | HEART RATE: 53 BPM | OXYGEN SATURATION: 95 %

## 2023-04-17 VITALS
SYSTOLIC BLOOD PRESSURE: 183 MMHG | RESPIRATION RATE: 16 BRPM | OXYGEN SATURATION: 100 % | TEMPERATURE: 98 F | HEART RATE: 60 BPM | DIASTOLIC BLOOD PRESSURE: 71 MMHG

## 2023-04-17 VITALS
HEART RATE: 57 BPM | DIASTOLIC BLOOD PRESSURE: 72 MMHG | TEMPERATURE: 98.78 F | OXYGEN SATURATION: 97 % | SYSTOLIC BLOOD PRESSURE: 160 MMHG | RESPIRATION RATE: 18 BRPM

## 2023-04-17 VITALS — HEART RATE: 61 BPM

## 2023-04-17 VITALS — HEART RATE: 100 BPM

## 2023-04-17 VITALS — HEART RATE: 54 BPM

## 2023-04-17 LAB
ANION GAP SERPL CALC-SCNC: 8.4 MEQ/L (ref 5–15)
BUN SERPL-MCNC: 15 MG/DL (ref 7–17)
CALCIUM SPEC-MCNC: 8.6 MG/DL (ref 8.4–10.2)
CHLORIDE SPEC-SCNC: 102 MMOL/L (ref 98–107)
CO2 SERPL-SCNC: 28 MMOL/L (ref 22–30)
CREAT BLD-SCNC: 0.5 MG/DL (ref 0.52–1.04)
CREATININE CLEARANCE ESTIMATED: 42 ML/MIN (ref 50–200)
ESTIMATED GLOMERULAR FILT RATE: 117 ML/MIN (ref 60–?)
GFR (AFRICAN AMERICAN): 141 ML/MIN (ref 60–?)
GLUCOSE: 102 MG/DL (ref 74–100)
HCT VFR BLD CALC: 37.4 % (ref 37–47)
HGB BLD-MCNC: 11.8 G/DL (ref 12.2–16.2)
MCHC RBC-ENTMCNC: 31.7 G/DL (ref 31.8–35.4)
MCV RBC: 95.5 FL (ref 81–99)
MEAN CORPUSCULAR HEMOGLOBIN: 30.3 PG (ref 27–31.2)
PLATELET # BLD: 230 K/MM3 (ref 142–424)
POTASSIUM: 4.4 MMOL/L (ref 3.5–5.1)
RBC # BLD AUTO: 3.91 M/MM3 (ref 4.2–5.4)
SODIUM SPEC-SCNC: 134 MMOL/L (ref 136–145)
WBC # BLD AUTO: 7.9 K/MM3 (ref 4.8–10.8)

## 2023-04-17 NOTE — PC.NURSE
Pt is aox4 with son present in the room, she is up with assistance times one, Left arm in a sling from where she fell a few days ago at home. She has gotten pain pills twice on my shift, takes pills whole with no issues.

## 2023-05-25 RX ORDER — AMLODIPINE BESYLATE 5 MG/1
5 TABLET ORAL DAILY
Qty: 90 TABLET | Refills: 0 | Status: SHIPPED | OUTPATIENT
Start: 2023-05-25

## 2023-05-25 RX ORDER — ASPIRIN 81 MG/1
81 TABLET ORAL DAILY
Qty: 90 TABLET | Refills: 0 | Status: SHIPPED | OUTPATIENT
Start: 2023-05-25

## 2023-05-25 RX ORDER — HYDROCHLOROTHIAZIDE 25 MG/1
25 TABLET ORAL DAILY
Qty: 90 TABLET | Refills: 0 | Status: SHIPPED | OUTPATIENT
Start: 2023-05-25

## 2023-05-25 RX ORDER — EZETIMIBE 10 MG/1
10 TABLET ORAL DAILY
Qty: 90 TABLET | Refills: 0 | Status: SHIPPED | OUTPATIENT
Start: 2023-05-25

## 2023-05-25 RX ORDER — SOTALOL HYDROCHLORIDE 80 MG/1
80 TABLET ORAL 2 TIMES DAILY
Qty: 180 TABLET | Refills: 0 | Status: SHIPPED | OUTPATIENT
Start: 2023-05-25

## 2023-05-25 RX ORDER — ISOSORBIDE MONONITRATE 30 MG/1
30 TABLET, EXTENDED RELEASE ORAL DAILY
Qty: 90 TABLET | Refills: 0 | Status: SHIPPED | OUTPATIENT
Start: 2023-05-25

## 2023-06-07 RX ORDER — SOTALOL HYDROCHLORIDE 80 MG/1
TABLET ORAL
Qty: 180 TABLET | Refills: 0 | OUTPATIENT
Start: 2023-06-07

## 2023-07-27 ENCOUNTER — TELEPHONE (OUTPATIENT)
Dept: CARDIOLOGY | Facility: CLINIC | Age: 88
End: 2023-07-27
Payer: MEDICARE

## 2023-07-27 NOTE — TELEPHONE ENCOUNTER
"  Caller: Yuni Mclain [address] \"Elba\"    Relationship to patient: Self    Best call back number: [phone]      Type of visit: F/U    Requested date: NEXT AVAILABLE     If rescheduling, when is the original appointment: 07-31-23     Additional notes:PATIENT HAD TO CANCEL THE 07-31-23 APPT. PLEASE CONTACT PATIENT ABOUT THE NEXT AVAILABLE DATE FOR A FOLLOW UP.           "

## 2023-09-07 ENCOUNTER — HOSPITAL ENCOUNTER (EMERGENCY)
Facility: HOSPITAL | Age: 88
Discharge: HOME | End: 2023-09-07
Attending: EMERGENCY MEDICINE
Payer: MEDICARE

## 2023-09-07 ENCOUNTER — APPOINTMENT (OUTPATIENT)
Dept: CARDIOLOGY | Facility: HOSPITAL | Age: 88
End: 2023-09-07
Payer: MEDICARE

## 2023-09-07 VITALS
HEIGHT: 67 IN | TEMPERATURE: 98 F | SYSTOLIC BLOOD PRESSURE: 174 MMHG | OXYGEN SATURATION: 96 % | RESPIRATION RATE: 18 BRPM | WEIGHT: 145 LBS | BODY MASS INDEX: 22.76 KG/M2 | HEART RATE: 60 BPM | DIASTOLIC BLOOD PRESSURE: 77 MMHG

## 2023-09-07 DIAGNOSIS — Z86.79 HISTORY OF HYPERTENSION: ICD-10-CM

## 2023-09-07 DIAGNOSIS — R60.0 BILATERAL EDEMA OF LOWER EXTREMITY: Primary | ICD-10-CM

## 2023-09-07 LAB
ALBUMIN SERPL-MCNC: 4.1 G/DL (ref 3.5–5.2)
ALBUMIN/GLOB SERPL: 1.2 G/DL
ALP SERPL-CCNC: 60 U/L (ref 39–117)
ALT SERPL W P-5'-P-CCNC: 14 U/L (ref 1–33)
ANION GAP SERPL CALCULATED.3IONS-SCNC: 9 MMOL/L (ref 5–15)
AST SERPL-CCNC: 19 U/L (ref 1–32)
BASOPHILS # BLD AUTO: 0.04 10*3/MM3 (ref 0–0.2)
BASOPHILS NFR BLD AUTO: 0.6 % (ref 0–1.5)
BILIRUB SERPL-MCNC: 0.4 MG/DL (ref 0–1.2)
BUN SERPL-MCNC: 20 MG/DL (ref 8–23)
BUN/CREAT SERPL: 24.4 (ref 7–25)
CALCIUM SPEC-SCNC: 9.5 MG/DL (ref 8.6–10.5)
CHLORIDE SERPL-SCNC: 100 MMOL/L (ref 98–107)
CO2 SERPL-SCNC: 29 MMOL/L (ref 22–29)
CREAT SERPL-MCNC: 0.82 MG/DL (ref 0.57–1)
DEPRECATED RDW RBC AUTO: 48.3 FL (ref 37–54)
EGFRCR SERPLBLD CKD-EPI 2021: 69.3 ML/MIN/1.73
EOSINOPHIL # BLD AUTO: 0.22 10*3/MM3 (ref 0–0.4)
EOSINOPHIL NFR BLD AUTO: 3.1 % (ref 0.3–6.2)
ERYTHROCYTE [DISTWIDTH] IN BLOOD BY AUTOMATED COUNT: 13.5 % (ref 12.3–15.4)
GEN 5 2HR TROPONIN T REFLEX: 14 NG/L
GLOBULIN UR ELPH-MCNC: 3.3 GM/DL
GLUCOSE SERPL-MCNC: 95 MG/DL (ref 65–99)
HCT VFR BLD AUTO: 38.6 % (ref 34–46.6)
HGB BLD-MCNC: 12.2 G/DL (ref 12–15.9)
HOLD SPECIMEN: NORMAL
IMM GRANULOCYTES # BLD AUTO: 0.04 10*3/MM3 (ref 0–0.05)
IMM GRANULOCYTES NFR BLD AUTO: 0.6 % (ref 0–0.5)
LYMPHOCYTES # BLD AUTO: 2.38 10*3/MM3 (ref 0.7–3.1)
LYMPHOCYTES NFR BLD AUTO: 33.3 % (ref 19.6–45.3)
MCH RBC QN AUTO: 30.7 PG (ref 26.6–33)
MCHC RBC AUTO-ENTMCNC: 31.6 G/DL (ref 31.5–35.7)
MCV RBC AUTO: 97 FL (ref 79–97)
MONOCYTES # BLD AUTO: 0.64 10*3/MM3 (ref 0.1–0.9)
MONOCYTES NFR BLD AUTO: 9 % (ref 5–12)
NEUTROPHILS NFR BLD AUTO: 3.82 10*3/MM3 (ref 1.7–7)
NEUTROPHILS NFR BLD AUTO: 53.4 % (ref 42.7–76)
NRBC BLD AUTO-RTO: 0 /100 WBC (ref 0–0.2)
NT-PROBNP SERPL-MCNC: 1137 PG/ML (ref 0–1800)
PLATELET # BLD AUTO: 264 10*3/MM3 (ref 140–450)
PMV BLD AUTO: 9.4 FL (ref 6–12)
POTASSIUM SERPL-SCNC: 4.4 MMOL/L (ref 3.5–5.2)
PROT SERPL-MCNC: 7.4 G/DL (ref 6–8.5)
RBC # BLD AUTO: 3.98 10*6/MM3 (ref 3.77–5.28)
SODIUM SERPL-SCNC: 138 MMOL/L (ref 136–145)
TROPONIN T DELTA: -2 NG/L
TROPONIN T SERPL HS-MCNC: 16 NG/L
WBC NRBC COR # BLD: 7.14 10*3/MM3 (ref 3.4–10.8)
WHOLE BLOOD HOLD COAG: NORMAL
WHOLE BLOOD HOLD SPECIMEN: NORMAL

## 2023-09-07 PROCEDURE — 80053 COMPREHEN METABOLIC PANEL: CPT | Performed by: EMERGENCY MEDICINE

## 2023-09-07 PROCEDURE — 93005 ELECTROCARDIOGRAM TRACING: CPT | Performed by: EMERGENCY MEDICINE

## 2023-09-07 PROCEDURE — 85025 COMPLETE CBC W/AUTO DIFF WBC: CPT | Performed by: EMERGENCY MEDICINE

## 2023-09-07 PROCEDURE — 99284 EMERGENCY DEPT VISIT MOD MDM: CPT

## 2023-09-07 PROCEDURE — 83880 ASSAY OF NATRIURETIC PEPTIDE: CPT | Performed by: EMERGENCY MEDICINE

## 2023-09-07 PROCEDURE — 93970 EXTREMITY STUDY: CPT

## 2023-09-07 PROCEDURE — 93970 EXTREMITY STUDY: CPT | Performed by: INTERNAL MEDICINE

## 2023-09-07 PROCEDURE — 36415 COLL VENOUS BLD VENIPUNCTURE: CPT

## 2023-09-07 PROCEDURE — 84484 ASSAY OF TROPONIN QUANT: CPT | Performed by: EMERGENCY MEDICINE

## 2023-09-07 RX ORDER — FUROSEMIDE 20 MG/1
20 TABLET ORAL DAILY
Qty: 5 TABLET | Refills: 0 | Status: SHIPPED | OUTPATIENT
Start: 2023-09-07 | End: 2023-09-12

## 2023-09-07 RX ORDER — FUROSEMIDE 40 MG/1
40 TABLET ORAL ONCE
Status: COMPLETED | OUTPATIENT
Start: 2023-09-07 | End: 2023-09-07

## 2023-09-07 RX ORDER — SODIUM CHLORIDE 0.9 % (FLUSH) 0.9 %
10 SYRINGE (ML) INJECTION AS NEEDED
Status: DISCONTINUED | OUTPATIENT
Start: 2023-09-07 | End: 2023-09-07 | Stop reason: HOSPADM

## 2023-09-07 RX ADMIN — FUROSEMIDE 40 MG: 40 TABLET ORAL at 19:26

## 2023-09-07 NOTE — ED PROVIDER NOTES
Subjective   History of Present Illness  Pt is a 87 yo female presenting to ED with complaints of leg swelling. PMHx significant for HTN, HLD, MR and Arthritis. Pt reports bilateral lower leg swelling for the past 2 weeks. She hasn't been as active [place] her [place] walking around due to the swelling. She reports some soreness to lower legs but no redness, weakness or numbness. She denies fever, chills, CP, SOB or cough. She denies hx of CHF or kidney disease. She denies hx of DVT or PE. She is not anticoagulated. She uses ETOH occasionally and denies tobacco use.     History provided by:  Patient, relative and medical records    Review of Systems   Constitutional:  Negative for fever.   Eyes:  Negative for visual disturbance.   Respiratory:  Negative for cough and shortness of breath.    Cardiovascular:  Positive for leg swelling. Negative for chest pain.   Gastrointestinal:  Negative for abdominal pain, diarrhea, nausea and vomiting.   Musculoskeletal:  Positive for myalgias. Negative for back pain.   Skin:  Negative for color change and wound.   Neurological:  Negative for dizziness, syncope, weakness and headaches.     Past Medical History:   Diagnosis Date    Atrial contractions, premature     Benign hypertension     Chest pain     Esophagitis     Heart disease     Hypercholesterolemia     Mitral regurgitation     Mild mitral regurgitation by echocardiography.    Osteoarthritis     Palpitations     Palpitations.  Reviewed the results of her Zio monitor with her.  Again, they are premature atrial contractions with no arrhythmias or pauses.  Will continue with flecainide, provided refills, and see her back in 8 months.     Pneumonia     UTI (urinary tract infection)        Allergies   Allergen Reactions    Albuterol Rash    Demerol [Meperidine] Itching    Statins Itching and Myalgia       Past Surgical History:   Procedure Laterality Date    APPENDECTOMY      BACK SURGERY      Low    BIOPSY OF BACK/FLANK  02/2022     BLADDER SURGERY      BREAST SURGERY      mammoplasty-reduction    BUNIONECTOMY Bilateral     CARDIAC CATHETERIZATION N/A 2021    Procedure: Left Heart Cath;  Surgeon: Lewis Echols III, MD;  Location:  [facility];  Service: Cardiovascular;  Laterality: N/A;    CARDIAC SURGERY      cardiac stent x 1 ()    CARPAL TUNNEL RELEASE Bilateral     EPIDURAL  2022    spine    EYE SURGERY      retina patch bilat ()    HYSTERECTOMY      KNEE ARTHROPLASTY Right     LUMBAR DISC SURGERY  2022    REPLACEMENT TOTAL KNEE Right     SHOULDER ARTHROSCOPY W/ ROTATOR CUFF REPAIR Left 2017    Procedure: SHOULDER ARTHROSCOPY WITH ROTATOR CUFF REPAIR LEFT;  Surgeon: Parrish Brgiht MD;  Location:  [facility];  Service:        Family History   Problem Relation Age of Onset    Heart disease Mother     Cancer Father     Arthritis Father        Social History     Socioeconomic History    Marital status:    Tobacco Use    Smoking status: Former     Types: Cigarettes     Quit date: 1969     Years since quittin.7    Smokeless tobacco: Never   Vaping Use    Vaping Use: Never used   Substance and Sexual Activity    Alcohol use: Yes     Comment: occasionally    Drug use: No    Sexual activity: Defer           Objective   Physical Exam  Vitals and nursing note reviewed.   Constitutional:       General: She is not in acute distress.  HENT:      Head: Atraumatic.   Eyes:      Extraocular Movements: Extraocular movements intact.      Conjunctiva/sclera: Conjunctivae normal.   Cardiovascular:      Rate and Rhythm: Normal rate.      Pulses: Normal pulses.   Pulmonary:      Effort: Pulmonary effort is normal. No respiratory distress.   Musculoskeletal:         General: Normal range of motion.      Cervical back: Normal range of motion and neck supple.      Right lower le+ Pitting Edema present.      Left lower le+ Pitting Edema present.   Skin:     General: Skin is warm.       Capillary Refill: Capillary refill takes less than 2 seconds.   Neurological:      General: No focal deficit present.      Mental Status: She is alert and oriented to person, place, and time.   Psychiatric:         Mood and Affect: Mood normal.         Behavior: Behavior normal.       Procedures           ED Course           Recent Results (from the past 24 hour(s))   Comprehensive Metabolic Panel    Collection Time: 09/07/23  4:26 PM    Specimen: Blood   Result Value Ref Range    Glucose 95 65 - 99 mg/dL    BUN 20 8 - 23 mg/dL    Creatinine 0.82 0.57 - 1.00 mg/dL    Sodium 138 136 - 145 mmol/L    Potassium 4.4 3.5 - 5.2 mmol/L    Chloride 100 98 - 107 mmol/L    CO2 29.0 22.0 - 29.0 mmol/L    Calcium 9.5 8.6 - 10.5 mg/dL    Total Protein 7.4 6.0 - 8.5 g/dL    Albumin 4.1 3.5 - 5.2 g/dL    ALT (SGPT) 14 1 - 33 U/L    AST (SGOT) 19 1 - 32 U/L    Alkaline Phosphatase 60 39 - 117 U/L    Total Bilirubin 0.4 0.0 - 1.2 mg/dL    Globulin 3.3 gm/dL    A/G Ratio 1.2 g/dL    BUN/Creatinine Ratio 24.4 7.0 - 25.0    Anion Gap 9.0 5.0 - 15.0 mmol/L    eGFR 69.3 >60.0 mL/min/1.73   BNP    Collection Time: 09/07/23  4:26 PM    Specimen: Blood   Result Value Ref Range    proBNP 1,137.0 0.0 - 1,800.0 pg/mL   High Sensitivity Troponin T    Collection Time: 09/07/23  4:26 PM    Specimen: Blood   Result Value Ref Range    HS Troponin T 16 (H) <10 ng/L   Green Top (Gel)    Collection Time: 09/07/23  4:26 PM   Result Value Ref Range    Extra Tube Hold for add-ons.    Lavender Top    Collection Time: 09/07/23  4:26 PM   Result Value Ref Range    Extra Tube hold for add-on    Gold Top - SST    Collection Time: 09/07/23  4:26 PM   Result Value Ref Range    Extra Tube Hold for add-ons.    Gray Top    Collection Time: 09/07/23  4:26 PM   Result Value Ref Range    Extra Tube Hold for add-ons.    Light Blue Top    Collection Time: 09/07/23  4:26 PM   Result Value Ref Range    Extra Tube Hold for add-ons.    CBC Auto Differential    Collection  Time: 09/07/23  4:26 PM    Specimen: Blood   Result Value Ref Range    WBC 7.14 3.40 - 10.80 10*3/mm3    RBC 3.98 3.77 - 5.28 10*6/mm3    Hemoglobin 12.2 12.0 - 15.9 g/dL    Hematocrit 38.6 34.0 - 46.6 %    MCV 97.0 79.0 - 97.0 fL    MCH 30.7 26.6 - 33.0 pg    MCHC 31.6 31.5 - 35.7 g/dL    RDW 13.5 12.3 - 15.4 %    RDW-SD 48.3 37.0 - 54.0 fl    MPV 9.4 6.0 - 12.0 fL    Platelets 264 140 - 450 10*3/mm3    Neutrophil % 53.4 42.7 - 76.0 %    Lymphocyte % 33.3 19.6 - 45.3 %    Monocyte % 9.0 5.0 - 12.0 %    Eosinophil % 3.1 0.3 - 6.2 %    Basophil % 0.6 0.0 - 1.5 %    Immature Grans % 0.6 (H) 0.0 - 0.5 %    Neutrophils, Absolute 3.82 1.70 - 7.00 10*3/mm3    Lymphocytes, Absolute 2.38 0.70 - 3.10 10*3/mm3    Monocytes, Absolute 0.64 0.10 - 0.90 10*3/mm3    Eosinophils, Absolute 0.22 0.00 - 0.40 10*3/mm3    Basophils, Absolute 0.04 0.00 - 0.20 10*3/mm3    Immature Grans, Absolute 0.04 0.00 - 0.05 10*3/mm3    nRBC 0.0 0.0 - 0.2 /100 WBC   ECG 12 Lead Other; bilateral leg swelling    Collection Time: 09/07/23  4:26 PM   Result Value Ref Range    QT Interval 434 ms    QTC Interval 426 ms   Duplex Venous Lower Extremity - Bilateral CAR    Collection Time: 09/07/23  5:12 PM   Result Value Ref Range    BH CV VAS PRELIMINARY FINDINGS SCRIPTING 1.0     Right Common Femoral Spont Y     Right Common Femoral Phasic Y     Right Common Femoral Compress C     Right Common Femoral Augment Y     Right Saphenofemoral Junction Compress C     Right Saphenofemoral Junction Augment Y     Right Profunda Femoral Phasic Y     Right Profunda Femoral Compress C     Right Profunda Femoral Augment Y     Right Proximal Femoral Compress C     Right Proximal Femoral Augment Y     Right Mid Femoral Spont Y     Right Mid Femoral Phasic Y     Right Mid Femoral Compress C     Right Mid Femoral Augment Y     Right Distal Femoral Compress C     Right Distal Femoral Augment Y     Right Popliteal Spont Y     Right Popliteal Phasic Y     Right Popliteal  Compress C     Right Popliteal Augment Y     Right Posterior Tibial Compress C     Right Peroneal Compress C     Right Gastronemius Compress C     Right Greater Saph AK Compress C     Right Greater Saph BK Compress C     Right Lesser Saph Compress C     Left Common Femoral Spont Y     Left Common Femoral Phasic Y     Left Common Femoral Compress C     Left Common Femoral Augment Y     Left Saphenofemoral Junction Phasic Y     Left Saphenofemoral Junction Compress C     Left Saphenofemoral Junction Augment Y     Left Profunda Femoral Compress C     Left Profunda Femoral Augment Y     Left Proximal Femoral Compress C     Left Proximal Femoral Augment Y     Left Mid Femoral Spont Y     Left Mid Femoral Phasic Y     Left Mid Femoral Compress C     Left Mid Femoral Augment Y     Left Distal Femoral Compress C     Left Distal Femoral Augment Y     Left Popliteal Spont Y     Left Popliteal Phasic Y     Left Popliteal Compress C     Left Popliteal Augment Y     Left Posterior Tibial Compress C     Left Peroneal Compress C     Left Gastronemius Compress C     Left Greater Saph AK Compress C     Left Greater Saph BK Compress C     Left Lesser Saph Compress C    High Sensitivity Troponin T 2Hr    Collection Time: 09/07/23  6:32 PM    Specimen: Blood   Result Value Ref Range    HS Troponin T 14 (H) <10 ng/L    Troponin T Delta -2 >=-4 - <+4 ng/L     Note: In addition to lab results from this visit, the labs listed above may include labs taken at another facility or during a different encounter within the last 24 hours. Please correlate lab times with ED admission and discharge times for further clarification of the services performed during this visit.    No orders to display     Vitals:    09/07/23 1438 09/07/23 1712 09/07/23 1845   BP: 134/62  174/77   BP Location: Left arm     Patient Position: Sitting     Pulse: 59  60   Resp: 18     Temp: 98 °F (36.7 °C)     TempSrc: Oral     SpO2: 96%  96%   Weight: 65.8 kg (145 lb) 65.8  "kg (145 lb)    Height: 167.6 cm (66\") 170 cm (66.93\")      Medications   furosemide (LASIX) tablet 40 mg (40 mg Oral Given 9/7/23 1926)     ECG/EMG Results (last 24 hours)       Procedure Component Value Units Date/Time    ECG 12 Lead Other; bilateral leg swelling [[id number]] Collected: 09/07/23 1626     Updated: 09/07/23 1630     QT Interval 434 ms      QTC Interval 426 ms     Narrative:      Test Reason : Other~  Blood Pressure :   */*   mmHG  Vent. Rate :  58 BPM     Atrial Rate :  58 BPM     P-R Int : 156 ms          QRS Dur :  72 ms      QT Int : 434 ms       P-R-T Axes :  60   4  44 degrees     QTc Int : 426 ms    Sinus bradycardia with premature atrial complexes  Otherwise normal ECG  When compared with ECG of 05-JUL-2015 08:31,  premature atrial complexes are now present  Non-specific change in ST segment in Anterior leads    Referred By:            Confirmed By:           ECG 12 Lead Other; bilateral leg swelling   Preliminary Result   Test Reason : Other~   Blood Pressure :   */*   mmHG   Vent. Rate :  58 BPM     Atrial Rate :  58 BPM      P-R Int : 156 ms          QRS Dur :  72 ms       QT Int : 434 ms       P-R-T Axes :  60   4  44 degrees      QTc Int : 426 ms      Sinus bradycardia with premature atrial complexes   Otherwise normal ECG   When compared with ECG of 05-JUL-2015 08:31,   premature atrial complexes are now present   Non-specific change in ST segment in Anterior leads      Referred By:            Confirmed By:           DISCHARGE    Patient discharged in stable condition.    Reviewed implications of results, diagnosis, meds, responsibility to follow up, warning signs and symptoms of possible worsening, potential complications and reasons to return to ER.    Patient/Family voiced understanding of above instructions.    Discussed plan for discharge, as there is no emergent indication for admission.  Pt/family is agreeable and understands need for follow up and possible repeat testing.  " Pt/family is aware that discharge does not mean that nothing is wrong but that it indicates no emergency is currently present that requires admission and they must continue care with follow-up as given below or with a physician of their choice.     FOLLOW-UP  Sky Gruber MD  [address]  [address]  [place] [address]  [phone]    Schedule an appointment as soon as possible for a visit       Lewis Echols III, MD  [address]  [address]  [place] [id number] [phone]          [facility] EMERGENCY DEPARTMENT  [address] [facility] [address] [phone]    If symptoms worsen         Medication List        New Prescriptions      furosemide 20 MG tablet  Commonly known as: LASIX  Take 1 tablet by mouth Daily for 5 days.               Where to Get Your Medications        These medications were sent to [facility] [address] [place] [address] - [address] [facility] - [phone]  - [phone]   [address] [facility] [address]      Phone: [phone]   furosemide 20 MG tablet                                         Medical Decision Making  Pt is a 89 yo female presenting to ED with complaints of bilateral lower leg swelling for the past 2 weeks. Doppler in ED negative for DVT or SVT. Labs notable for HS Trop 16 and repeat 14. BNP 1, 137, WBC 7.14 and Cr 0.82. Discussed leg elevation, compression stockings and will provide short course of Lasix. She will f/u with PCP and Cards. She will return to ED if new / worse sx.     DDx  SVT, DVT,  PE, Cellulitis, Dependent edema, CHF, Renal failure     Problems Addressed:  Bilateral edema of lower extremity: complicated acute illness or injury  History of hypertension: complicated acute illness or injury    Amount and/or Complexity of Data Reviewed  External Data Reviewed: labs, radiology and notes.     Details: Cards  Labs:  Decision-making details documented in ED Course.  ECG/medicine tests:  Decision-making  details documented in ED Course.    Risk  Prescription drug management.        Final diagnoses:   Bilateral edema of lower extremity   History of hypertension       ED Disposition  ED Disposition       ED Disposition   Discharge    Condition   Stable    Comment   --               Sky Gruber MD  [address]  [address]  [place] [address]  [phone]    Schedule an appointment as soon as possible for a visit       Lewis Echols III, MD  [address] [facility]  [address]  [place] [id number] [phone]          [facility] EMERGENCY DEPARTMENT  [address] [facility] [address] [phone]    If symptoms worsen         Medication List        New Prescriptions      furosemide 20 MG tablet  Commonly known as: LASIX  Take 1 tablet by mouth Daily for 5 days.               Where to Get Your Medications        These medications were sent to [facility] [address] [place] [address] - [facility] [address] - [phone]  - [phone] [facility] [address] [facility] [address]      Phone: [phone]   furosemide 20 MG tablet            Santa Adams PA  09/07/23 4212

## 2023-09-07 NOTE — Clinical Note
[facility] EMERGENCY DEPARTMENT  [address]  [facility] [address]  Phone: [phone]    donn stevens accompanied Yuni Mclain to the emergency department on 9/7/2023. They may return to school on 09/08/2023.          Thank you for choosing [facility].      Milo Avila MD

## 2023-09-08 LAB
BH CV LOWER VASCULAR LEFT COMMON FEMORAL AUGMENT: NORMAL
BH CV LOWER VASCULAR LEFT COMMON FEMORAL COMPRESS: NORMAL
BH CV LOWER VASCULAR LEFT COMMON FEMORAL PHASIC: NORMAL
BH CV LOWER VASCULAR LEFT COMMON FEMORAL SPONT: NORMAL
BH CV LOWER VASCULAR LEFT DISTAL FEMORAL AUGMENT: NORMAL
BH CV LOWER VASCULAR LEFT DISTAL FEMORAL COMPRESS: NORMAL
BH CV LOWER VASCULAR LEFT GASTRONEMIUS COMPRESS: NORMAL
BH CV LOWER VASCULAR LEFT GREATER SAPH AK COMPRESS: NORMAL
BH CV LOWER VASCULAR LEFT GREATER SAPH BK COMPRESS: NORMAL
BH CV LOWER VASCULAR LEFT LESSER SAPH COMPRESS: NORMAL
BH CV LOWER VASCULAR LEFT MID FEMORAL AUGMENT: NORMAL
BH CV LOWER VASCULAR LEFT MID FEMORAL COMPRESS: NORMAL
BH CV LOWER VASCULAR LEFT MID FEMORAL PHASIC: NORMAL
BH CV LOWER VASCULAR LEFT MID FEMORAL SPONT: NORMAL
BH CV LOWER VASCULAR LEFT PERONEAL COMPRESS: NORMAL
BH CV LOWER VASCULAR LEFT POPLITEAL AUGMENT: NORMAL
BH CV LOWER VASCULAR LEFT POPLITEAL COMPRESS: NORMAL
BH CV LOWER VASCULAR LEFT POPLITEAL PHASIC: NORMAL
BH CV LOWER VASCULAR LEFT POPLITEAL SPONT: NORMAL
BH CV LOWER VASCULAR LEFT POSTERIOR TIBIAL COMPRESS: NORMAL
BH CV LOWER VASCULAR LEFT PROFUNDA FEMORAL AUGMENT: NORMAL
BH CV LOWER VASCULAR LEFT PROFUNDA FEMORAL COMPRESS: NORMAL
BH CV LOWER VASCULAR LEFT PROXIMAL FEMORAL AUGMENT: NORMAL
BH CV LOWER VASCULAR LEFT PROXIMAL FEMORAL COMPRESS: NORMAL
BH CV LOWER VASCULAR LEFT SAPHENOFEMORAL JUNCTION AUGMENT: NORMAL
BH CV LOWER VASCULAR LEFT SAPHENOFEMORAL JUNCTION COMPRESS: NORMAL
BH CV LOWER VASCULAR LEFT SAPHENOFEMORAL JUNCTION PHASIC: NORMAL
BH CV LOWER VASCULAR RIGHT COMMON FEMORAL AUGMENT: NORMAL
BH CV LOWER VASCULAR RIGHT COMMON FEMORAL COMPRESS: NORMAL
BH CV LOWER VASCULAR RIGHT COMMON FEMORAL PHASIC: NORMAL
BH CV LOWER VASCULAR RIGHT COMMON FEMORAL SPONT: NORMAL
BH CV LOWER VASCULAR RIGHT DISTAL FEMORAL AUGMENT: NORMAL
BH CV LOWER VASCULAR RIGHT DISTAL FEMORAL COMPRESS: NORMAL
BH CV LOWER VASCULAR RIGHT GASTRONEMIUS COMPRESS: NORMAL
BH CV LOWER VASCULAR RIGHT GREATER SAPH AK COMPRESS: NORMAL
BH CV LOWER VASCULAR RIGHT GREATER SAPH BK COMPRESS: NORMAL
BH CV LOWER VASCULAR RIGHT LESSER SAPH COMPRESS: NORMAL
BH CV LOWER VASCULAR RIGHT MID FEMORAL AUGMENT: NORMAL
BH CV LOWER VASCULAR RIGHT MID FEMORAL COMPRESS: NORMAL
BH CV LOWER VASCULAR RIGHT MID FEMORAL PHASIC: NORMAL
BH CV LOWER VASCULAR RIGHT MID FEMORAL SPONT: NORMAL
BH CV LOWER VASCULAR RIGHT PERONEAL COMPRESS: NORMAL
BH CV LOWER VASCULAR RIGHT POPLITEAL AUGMENT: NORMAL
BH CV LOWER VASCULAR RIGHT POPLITEAL COMPRESS: NORMAL
BH CV LOWER VASCULAR RIGHT POPLITEAL PHASIC: NORMAL
BH CV LOWER VASCULAR RIGHT POPLITEAL SPONT: NORMAL
BH CV LOWER VASCULAR RIGHT POSTERIOR TIBIAL COMPRESS: NORMAL
BH CV LOWER VASCULAR RIGHT PROFUNDA FEMORAL AUGMENT: NORMAL
BH CV LOWER VASCULAR RIGHT PROFUNDA FEMORAL COMPRESS: NORMAL
BH CV LOWER VASCULAR RIGHT PROFUNDA FEMORAL PHASIC: NORMAL
BH CV LOWER VASCULAR RIGHT PROXIMAL FEMORAL AUGMENT: NORMAL
BH CV LOWER VASCULAR RIGHT PROXIMAL FEMORAL COMPRESS: NORMAL
BH CV LOWER VASCULAR RIGHT SAPHENOFEMORAL JUNCTION AUGMENT: NORMAL
BH CV LOWER VASCULAR RIGHT SAPHENOFEMORAL JUNCTION COMPRESS: NORMAL
BH CV VAS PRELIMINARY FINDINGS SCRIPTING: 1

## 2023-09-09 LAB
QT INTERVAL: 434 MS
QTC INTERVAL: 426 MS

## 2023-09-11 RX ORDER — EZETIMIBE 10 MG/1
TABLET ORAL
Qty: 90 TABLET | Refills: 0 | Status: SHIPPED | OUTPATIENT
Start: 2023-09-11

## 2023-09-11 RX ORDER — HYDROCHLOROTHIAZIDE 25 MG/1
TABLET ORAL
Qty: 90 TABLET | Refills: 0 | Status: SHIPPED | OUTPATIENT
Start: 2023-09-11

## 2023-09-13 ENCOUNTER — TELEPHONE (OUTPATIENT)
Dept: CARDIOLOGY | Facility: CLINIC | Age: 88
End: 2023-09-13

## 2023-09-13 NOTE — TELEPHONE ENCOUNTER
Caller: Brigitte Willson    Relationship to patient: Emergency Contact    Best call back number: [phone]    Chief complaint: SWELLING IN LOWER EXTREMITIES    Type of visit: F/U    Requested date: NEXT AVAILABLE          Additional notes:NEXT AVAILABLE APPOINTMENT SHOWS IN 2024. PLEASE CONTACT PATIENT WITH A MORE SUITABLE DATE.

## 2023-09-27 ENCOUNTER — OFFICE VISIT (OUTPATIENT)
Dept: CARDIOLOGY | Facility: CLINIC | Age: 88
End: 2023-09-27
Payer: MEDICARE

## 2023-09-27 VITALS
BODY MASS INDEX: 23.39 KG/M2 | SYSTOLIC BLOOD PRESSURE: 126 MMHG | HEIGHT: 67 IN | OXYGEN SATURATION: 98 % | DIASTOLIC BLOOD PRESSURE: 64 MMHG | WEIGHT: 149 LBS | HEART RATE: 51 BPM

## 2023-09-27 DIAGNOSIS — R42 POSTURAL DIZZINESS WITH PRESYNCOPE: ICD-10-CM

## 2023-09-27 DIAGNOSIS — R55 POSTURAL DIZZINESS WITH PRESYNCOPE: ICD-10-CM

## 2023-09-27 DIAGNOSIS — I25.10 CORONARY ARTERY DISEASE INVOLVING NATIVE CORONARY ARTERY OF NATIVE HEART WITHOUT ANGINA PECTORIS: Primary | ICD-10-CM

## 2023-09-27 DIAGNOSIS — E78.00 HYPERCHOLESTEROLEMIA: ICD-10-CM

## 2023-09-27 DIAGNOSIS — I10 BENIGN HYPERTENSION: ICD-10-CM

## 2023-09-27 PROCEDURE — 99214 OFFICE O/P EST MOD 30 MIN: CPT | Performed by: INTERNAL MEDICINE

## 2023-09-27 RX ORDER — FLECAINIDE ACETATE 50 MG/1
TABLET ORAL
COMMUNITY
End: 2023-09-27

## 2023-09-27 NOTE — PROGRESS NOTES
[facility]  Office visit  Yuni Mclain  1935  [phone]    VISIT DATE:  9/27/2023      PCP: Sky Gruber MD  [address]  [facility] [address]    CC:  Chief Complaint   Patient presents with    Coronary artery disease involving native coronary artery of       PROBLEM LIST:  Premature atrial contractions and premature ventricular contractions:  Recent Zio monitoring confirming PACs, no arrhythmias, no pauses.  Hypercholesterolemia.  Benign hypertension.  Chest pain:  Mild to moderate coronary artery disease by catheterization, 2003, medically managed.  Recurrent episode since November using nitroglycerin x1.  Mild mitral regurgitation by echocardiography.      Previous cardiac studies and procedures:  2003 approximately: PCI with stenting, data insufficient.    July 2019 echo  Estimated EF appears to be in the range of 66 - 70%.  Left ventricular wall thickness is consistent with hypertrophy. Sigmoid-shaped ventricular septum is present.  Left ventricular diastolic dysfunction (grade I a) consistent with impaired relaxation.    March 2021   cardiac catheterization  60% proximal LAD, otherwise mild nonobstructive atherosclerosis.  Patent mid left circumflex stent  Normal LVEDP  Normal LVEF  No aortic stenosis  No angiographically significant mitral regurgitation  48-hour Holter monitor: The predominant rhythm noted during the testing period was sinus rhythm. Premature atrial contractions occured rarely. Premature ventricular contractions did not appear during monitoring.    March 2023 Holter, 48-hour    An abnormal monitor study.    Chronotropic incompetence.    Average HR: 53. Min HR: 44. Max HR: 71.    Occasional episodes of asymptomatic nonsustained atrial tachycardia.    ASSESSMENT:   Diagnosis Plan   1. Coronary artery disease involving native coronary artery of native heart without angina pectoris        2. Benign hypertension        3.  "Hypercholesterolemia        4. Postural dizziness with presyncope  Holter Monitor - 72 Hour Up To 15 Days          PLAN:  -Premature atrial contractions: Off class Ic agents due to history of CAD.  Discontinuing sotalol due to concern for chronotropic incompetence and symptomatic sinus bradycardia.    -Hypertension: Goal less than 140/90 mmHg, ideally less than 130/80 mmHg.  Currently with reasonable control.  Continue current medical therapy.    -Dyslipidemia: Continue current medical therapy.    Coronary artery disease: Currently stable asymptomatic.  Remote PCI.  Continue current medical therapy.    Presyncope with falls: Family is instructed to make sure she is off both flecainide and sotalol.  2-week ambulatory ECG monitor after 1 week washout to see if she has underlying evidence of sick sinus syndrome that would qualify for permanent pacing.    Subjective  Increasing episodes of lightheadedness, and orthostasis resulting in falls.  She is sporadically using a wheeled walker for ambulation.  Has not been consistently checking her blood pressure at home.  She has both flecainide and sotalol in her med list, unsure which one she is taking or if she is taking both.  She should be off flecainide and taking sotalol.  Her granddaughter is with her today.    PHYSICAL EXAMINATION:  Vitals:    09/27/23 0929   BP: 126/64   BP Location: Left arm   Patient Position: Sitting   Cuff Size: Adult   Pulse: 51   SpO2: 98%   Weight: 67.6 kg (149 lb)   Height: 168.9 cm (66.5\")     General Appearance:    Alert, cooperative, no distress, appears stated age   Head:    Normocephalic, without obvious abnormality, atraumatic   Eyes:    conjunctiva/corneas clear   Nose:   Nares normal, septum midline, mucosa normal, no drainage   Throat:   Lips, teeth and gums normal   Neck:   Supple, symmetrical, trachea midline, no carotid    bruit or JVD   Lungs:     Clear to auscultation bilaterally, respirations unlabored   Chest Wall:    No " tenderness or deformity, I do not appreciate a bruit over the right upper chest.    Heart:    Regular rate and rhythm, S1 and S2 normal, 2/6 early peaking systolic murmur right upper sternal border, rub   or gallop, normal carotid impulse bilaterally without bruit.   Abdomen:     Soft, non-tender   Extremities:   Extremities normal, atraumatic, no cyanosis or edema   Pulses:   2+ and symmetric all extremities   Skin:   Skin color, texture, turgor normal, no rashes or lesions       Diagnostic Data:  Procedures  Lab Results   Component Value Date    TRIG 87 03/26/2021    HDL 78 (H) 03/26/2021     Lab Results   Component Value Date    GLUCOSE 95 09/07/2023    BUN 20 09/07/2023    CREATININE 0.82 09/07/2023     09/07/2023    K 4.4 09/07/2023     09/07/2023    CO2 29.0 09/07/2023     Lab Results   Component Value Date    HGBA1C 4.8 07/05/2015     Lab Results   Component Value Date    WBC 7.14 09/07/2023    HGB 12.2 09/07/2023    HCT 38.6 09/07/2023     09/07/2023       Allergies  Allergies   Allergen Reactions    Albuterol Rash    Demerol [Meperidine] Itching    Statins Itching and Myalgia       Current Medications    Current Outpatient Medications:     amLODIPine (NORVASC) 5 MG tablet, Take 1 tablet by mouth Daily., Disp: 30 tablet, Rfl: 0    aspirin 81 MG EC tablet, Take 1 tablet by mouth Daily., Disp: 90 tablet, Rfl: 0    escitalopram (LEXAPRO) 5 MG tablet, Take 1 tablet by mouth Daily., Disp: , Rfl:     ezetimibe (ZETIA) 10 MG tablet, Take 1 tablet by mouth once daily, Disp: 90 tablet, Rfl: 0    folic acid (FOLVITE) 400 MCG tablet, Take 1 tablet by mouth Daily., Disp: , Rfl:     hydroCHLOROthiazide (HYDRODIURIL) 25 MG tablet, Take 1 tablet by mouth once daily, Disp: 90 tablet, Rfl: 0    isosorbide mononitrate (IMDUR) 30 MG 24 hr tablet, Take 1 tablet by mouth once daily, Disp: 90 tablet, Rfl: 0    niacin 500 MG tablet, Take 1 tablet by mouth Every Night., Disp: 90 tablet, Rfl: 3    furosemide  (LASIX) 20 MG tablet, Take 1 tablet by mouth Daily for 5 days., Disp: 5 tablet, Rfl: 0          ROS  Review of Systems   Cardiovascular:  Positive for irregular heartbeat and palpitations. Negative for chest pain and dyspnea on exertion.   Respiratory:  Positive for snoring. Negative for cough.    Genitourinary:  Positive for frequency.     SOCIAL HX  Social History     Socioeconomic History    Marital status:    Tobacco Use    Smoking status: Former     Types: Cigarettes     Quit date: 1969     Years since quittin.8    Smokeless tobacco: Never   Vaping Use    Vaping Use: Never used   Substance and Sexual Activity    Alcohol use: Yes     Comment: occasionally    Drug use: No    Sexual activity: Defer       FAMILY HX  Family History   Problem Relation Age of Onset    Heart disease Mother     Cancer Father     Arthritis Father              Lewis Echols III, MD, FACC

## 2023-10-30 ENCOUNTER — TELEPHONE (OUTPATIENT)
Dept: CARDIOLOGY | Facility: CLINIC | Age: 88
End: 2023-10-30
Payer: MEDICARE

## 2023-10-31 NOTE — TELEPHONE ENCOUNTER
Called Cady Granddaughter back. No answer. Called patient. Patient notified of message above from .

## 2023-12-01 RX ORDER — HYDROCHLOROTHIAZIDE 25 MG/1
TABLET ORAL
Qty: 90 TABLET | Refills: 0 | Status: SHIPPED | OUTPATIENT
Start: 2023-12-01

## 2024-01-08 ENCOUNTER — OFFICE VISIT (OUTPATIENT)
Dept: CARDIOLOGY | Facility: CLINIC | Age: 89
End: 2024-01-08
Payer: MEDICARE

## 2024-01-08 VITALS
HEART RATE: 55 BPM | WEIGHT: 169 LBS | DIASTOLIC BLOOD PRESSURE: 62 MMHG | BODY MASS INDEX: 26.53 KG/M2 | HEIGHT: 67 IN | SYSTOLIC BLOOD PRESSURE: 136 MMHG | OXYGEN SATURATION: 95 %

## 2024-01-08 DIAGNOSIS — I49.1 ATRIAL CONTRACTIONS, PREMATURE: Primary | ICD-10-CM

## 2024-01-08 DIAGNOSIS — I25.10 CORONARY ARTERY DISEASE INVOLVING NATIVE CORONARY ARTERY OF NATIVE HEART WITHOUT ANGINA PECTORIS: ICD-10-CM

## 2024-01-08 DIAGNOSIS — I10 BENIGN HYPERTENSION: ICD-10-CM

## 2024-01-08 DIAGNOSIS — E78.00 HYPERCHOLESTEROLEMIA: ICD-10-CM

## 2024-01-08 PROCEDURE — 99214 OFFICE O/P EST MOD 30 MIN: CPT | Performed by: INTERNAL MEDICINE

## 2024-01-08 RX ORDER — SOTALOL HYDROCHLORIDE 80 MG/1
80 TABLET ORAL DAILY
Start: 2024-01-08

## 2024-01-08 RX ORDER — ESCITALOPRAM OXALATE 10 MG/1
TABLET ORAL
COMMUNITY
Start: 2024-01-06

## 2024-01-08 RX ORDER — SOTALOL HYDROCHLORIDE 80 MG/1
80 TABLET ORAL 2 TIMES DAILY
COMMUNITY
End: 2024-01-08 | Stop reason: SDUPTHER

## 2024-01-08 RX ORDER — CHOLECALCIFEROL (VITAMIN D3) 125 MCG
5 CAPSULE ORAL NIGHTLY PRN
COMMUNITY

## 2024-01-08 RX ORDER — POTASSIUM CHLORIDE 750 MG/1
10 TABLET, FILM COATED, EXTENDED RELEASE ORAL AS NEEDED
Qty: 30 TABLET | Refills: 3 | Status: SHIPPED | OUTPATIENT
Start: 2024-01-08

## 2024-01-08 RX ORDER — FUROSEMIDE 20 MG/1
20 TABLET ORAL AS NEEDED
Qty: 30 TABLET | Refills: 3 | Status: SHIPPED | OUTPATIENT
Start: 2024-01-08

## 2024-01-08 NOTE — PROGRESS NOTES
[facility]  Office visit  Yuni Mclain  1935  [phone]    VISIT DATE:  1/8/2024      PCP: Sky Gruber MD  [address]  [facility] [address]    CC:  Chief Complaint   Patient presents with    Coronary artery disease involving native coronary artery of     3-Mo F/U       PROBLEM LIST:  Premature atrial contractions and premature ventricular contractions:  Recent Zio monitoring confirming PACs, no arrhythmias, no pauses.  Hypercholesterolemia.  Benign hypertension.  Chest pain:  Mild to moderate coronary artery disease by catheterization, 2003, medically managed.  Recurrent episode since November using nitroglycerin x1.  Mild mitral regurgitation by echocardiography.      Previous cardiac studies and procedures:  2003 approximately: PCI with stenting, data insufficient.    July 2019 echo  Estimated EF appears to be in the range of 66 - 70%.  Left ventricular wall thickness is consistent with hypertrophy. Sigmoid-shaped ventricular septum is present.  Left ventricular diastolic dysfunction (grade I a) consistent with impaired relaxation.    March 2021   cardiac catheterization  60% proximal LAD, otherwise mild nonobstructive atherosclerosis.  Patent mid left circumflex stent  Normal LVEDP  Normal LVEF  No aortic stenosis  No angiographically significant mitral regurgitation  48-hour Holter monitor: The predominant rhythm noted during the testing period was sinus rhythm. Premature atrial contractions occured rarely. Premature ventricular contractions did not appear during monitoring.    March 2023 Holter, 48-hour    An abnormal monitor study.    Chronotropic incompetence.    Average HR: 53. Min HR: 44. Max HR: 71.    Occasional episodes of asymptomatic nonsustained atrial tachycardia.    ASSESSMENT:   Diagnosis Plan   1. Atrial contractions, premature        2. Benign hypertension        3. Coronary artery disease involving native coronary artery of native  "heart without angina pectoris        4. Hypercholesterolemia              PLAN:  -Premature atrial contractions: Off class Ic agents due to history of CAD.  Discontinuing sotalol due to concern for chronotropic incompetence and symptomatic sinus bradycardia.    -Hypertension: Goal less than 140/90 mmHg, ideally less than 130/80 mmHg.  Currently with reasonable control.  Continue current medical therapy..  Low-dose Lasix for dependent edema.    -Dyslipidemia: Continue current medical therapy.    Coronary artery disease: Currently stable asymptomatic.  Remote PCI.  Continue current medical therapy.    Presyncope with falls: Family is instructed to make sure she is off both flecainide and sotalol.  2-week ambulatory ECG monitor after 1 week washout to see if she has underlying evidence of sick sinus syndrome that would qualify for permanent pacing.    Subjective  Reports that she is feeling good.  Using a cane for ambulation, intermittently a walker.  Blood pressures running less than 140/90 mmHg.  No chest pain, sustained palpitations and dyspnea.  Stable mild bilateral dependent edema which intermittently worsens.    PHYSICAL EXAMINATION:  Vitals:    01/08/24 0944   BP: 136/62   BP Location: Right arm   Patient Position: Sitting   Cuff Size: Adult   Pulse: 55   SpO2: 95%   Weight: 76.7 kg (169 lb)   Height: 168.9 cm (66.5\")       General Appearance:    Alert, cooperative, no distress, appears stated age   Head:    Normocephalic, without obvious abnormality, atraumatic   Eyes:    conjunctiva/corneas clear   Nose:   Nares normal, septum midline, mucosa normal, no drainage   Throat:   Lips, teeth and gums normal   Neck:   Supple, symmetrical, trachea midline, no carotid    bruit or JVD   Lungs:     Clear to auscultation bilaterally, respirations unlabored   Chest Wall:    No tenderness or deformity, I do not appreciate a bruit over the right upper chest.    Heart:    Regular rate and rhythm, S1 and S2 normal, 2/6 early " peaking systolic murmur right upper sternal border, rub   or gallop, normal carotid impulse bilaterally without bruit.   Abdomen:     Soft, non-tender   Extremities:   Extremities normal, atraumatic, no cyanosis.  1+ bilateral pedal and pretibial edema.   Pulses:   2+ and symmetric all extremities   Skin:   Skin color, texture, turgor normal, no rashes or lesions       Diagnostic Data:  Procedures  Lab Results   Component Value Date    TRIG 87 03/26/2021    HDL 78 (H) 03/26/2021     Lab Results   Component Value Date    GLUCOSE 95 09/07/2023    BUN 20 09/07/2023    CREATININE 0.82 09/07/2023     09/07/2023    K 4.4 09/07/2023     09/07/2023    CO2 29.0 09/07/2023     Lab Results   Component Value Date    HGBA1C 4.8 07/05/2015     Lab Results   Component Value Date    WBC 7.14 09/07/2023    HGB 12.2 09/07/2023    HCT 38.6 09/07/2023     09/07/2023       Allergies  Allergies   Allergen Reactions    Albuterol Rash    Demerol [Meperidine] Itching    Statins Itching and Myalgia       Current Medications    Current Outpatient Medications:     amLODIPine (NORVASC) 5 MG tablet, Take 1 tablet by mouth Daily., Disp: 30 tablet, Rfl: 0    aspirin 81 MG EC tablet, Take 1 tablet by mouth Daily., Disp: 90 tablet, Rfl: 0    escitalopram (LEXAPRO) 10 MG tablet, , Disp: , Rfl:     ezetimibe (ZETIA) 10 MG tablet, Take 1 tablet by mouth once daily, Disp: 90 tablet, Rfl: 0    folic acid (FOLVITE) 400 MCG tablet, Take 1 tablet by mouth Daily., Disp: , Rfl:     hydroCHLOROthiazide (HYDRODIURIL) 25 MG tablet, Take 1 tablet by mouth once daily, Disp: 90 tablet, Rfl: 0    isosorbide mononitrate (IMDUR) 30 MG 24 hr tablet, Take 1 tablet by mouth once daily, Disp: 90 tablet, Rfl: 0    melatonin 5 MG tablet tablet, Take 1 tablet by mouth At Night As Needed. Melatonin gummy product, Disp: , Rfl:     niacin 500 MG tablet, Take 1 tablet by mouth Every Night., Disp: 90 tablet, Rfl: 3    sotalol (BETAPACE) 80 MG tablet, Take 1  tablet by mouth Daily., Disp: , Rfl:     furosemide (LASIX) 20 MG tablet, Take 1 tablet by mouth As Needed (daily as needed for edema)., Disp: 30 tablet, Rfl: 3    potassium chloride 10 MEQ CR tablet, Take 1 tablet by mouth As Needed (take with lasix)., Disp: 30 tablet, Rfl: 3          ROS  Review of Systems   Cardiovascular:  Positive for irregular heartbeat and palpitations. Negative for chest pain and dyspnea on exertion.   Respiratory:  Positive for snoring. Negative for cough.    Genitourinary:  Positive for frequency.       SOCIAL HX  Social History     Socioeconomic History    Marital status:    Tobacco Use    Smoking status: Former     Packs/day: 1.00     Years: 10.00     Additional pack years: 0.00     Total pack years: 10.00     Types: Cigarettes     Quit date: 1969     Years since quittin.0    Smokeless tobacco: Never   Vaping Use    Vaping Use: Never used   Substance and Sexual Activity    Alcohol use: Yes     Comment: occasionally    Drug use: No    Sexual activity: Defer       FAMILY HX  Family History   Problem Relation Age of Onset    Heart disease Mother     Cancer Father     Arthritis Father              Lewis Echols III, MD, FACC

## 2024-03-06 RX ORDER — HYDROCHLOROTHIAZIDE 25 MG/1
TABLET ORAL
Qty: 90 TABLET | Refills: 0 | Status: SHIPPED | OUTPATIENT
Start: 2024-03-06

## 2024-05-06 RX ORDER — AMLODIPINE BESYLATE 5 MG/1
5 TABLET ORAL DAILY
Qty: 90 TABLET | Refills: 0 | Status: SHIPPED | OUTPATIENT
Start: 2024-05-06

## 2024-05-07 RX ORDER — FUROSEMIDE 20 MG/1
20 TABLET ORAL DAILY PRN
Qty: 30 TABLET | Refills: 1 | Status: SHIPPED | OUTPATIENT
Start: 2024-05-07

## 2024-05-07 RX ORDER — POTASSIUM CHLORIDE 750 MG/1
10 TABLET, FILM COATED, EXTENDED RELEASE ORAL AS NEEDED
Qty: 30 TABLET | Refills: 1 | Status: SHIPPED | OUTPATIENT
Start: 2024-05-07

## 2024-05-31 RX ORDER — HYDROCHLOROTHIAZIDE 25 MG/1
TABLET ORAL
Qty: 90 TABLET | Refills: 0 | Status: SHIPPED | OUTPATIENT
Start: 2024-05-31

## 2024-07-30 RX ORDER — AMLODIPINE BESYLATE 5 MG/1
5 TABLET ORAL DAILY
Qty: 90 TABLET | Refills: 0 | Status: SHIPPED | OUTPATIENT
Start: 2024-07-30

## 2024-09-03 RX ORDER — HYDROCHLOROTHIAZIDE 25 MG/1
25 TABLET ORAL DAILY
Qty: 30 TABLET | Refills: 0 | Status: SHIPPED | OUTPATIENT
Start: 2024-09-03

## 2024-09-03 RX ORDER — ISOSORBIDE MONONITRATE 30 MG/1
30 TABLET, EXTENDED RELEASE ORAL DAILY
Qty: 30 TABLET | Refills: 0 | Status: SHIPPED | OUTPATIENT
Start: 2024-09-03

## 2024-09-16 RX ORDER — AMLODIPINE BESYLATE 5 MG/1
5 TABLET ORAL DAILY
Qty: 90 TABLET | Refills: 0 | Status: SHIPPED | OUTPATIENT
Start: 2024-09-16

## 2024-09-30 ENCOUNTER — HOSPITAL ENCOUNTER (EMERGENCY)
Age: 89
Discharge: HOME | End: 2024-09-30
Payer: MEDICARE

## 2024-09-30 VITALS
DIASTOLIC BLOOD PRESSURE: 66 MMHG | HEART RATE: 60 BPM | TEMPERATURE: 98.06 F | SYSTOLIC BLOOD PRESSURE: 148 MMHG | RESPIRATION RATE: 16 BRPM | OXYGEN SATURATION: 96 %

## 2024-09-30 VITALS — SYSTOLIC BLOOD PRESSURE: 170 MMHG | HEART RATE: 64 BPM | DIASTOLIC BLOOD PRESSURE: 86 MMHG | OXYGEN SATURATION: 92 %

## 2024-09-30 VITALS
RESPIRATION RATE: 16 BRPM | TEMPERATURE: 97.3 F | DIASTOLIC BLOOD PRESSURE: 86 MMHG | OXYGEN SATURATION: 95 % | HEART RATE: 64 BPM | SYSTOLIC BLOOD PRESSURE: 170 MMHG

## 2024-09-30 VITALS — HEART RATE: 65 BPM | OXYGEN SATURATION: 94 % | DIASTOLIC BLOOD PRESSURE: 100 MMHG | SYSTOLIC BLOOD PRESSURE: 148 MMHG

## 2024-09-30 VITALS — BODY MASS INDEX: 24.2 KG/M2

## 2024-09-30 DIAGNOSIS — S01.81XA: ICD-10-CM

## 2024-09-30 DIAGNOSIS — S81.802A: ICD-10-CM

## 2024-09-30 DIAGNOSIS — S09.90XA: Primary | ICD-10-CM

## 2024-09-30 DIAGNOSIS — Z23: ICD-10-CM
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
## 2024-09-30 LAB
ALBUMIN LEVEL: 4 G/DL (ref 3.5–5)
ALBUMIN/GLOB SERPL: 1.4 {RATIO} (ref 1.1–1.8)
ALP ISO SERPL-ACNC: 60 U/L (ref 38–126)
ALT SERPLBLD-CCNC: 19 U/L (ref 12–78)
ANION GAP SERPL CALC-SCNC: 7.4 MEQ/L (ref 5–15)
AST SERPL QL: 32 U/L (ref 14–36)
BILIRUBIN,TOTAL: 0.4 MG/DL (ref 0.2–1.3)
BUN SERPL-MCNC: 21 MG/DL (ref 7–17)
CALCIUM SPEC-MCNC: 9.8 MG/DL (ref 8.4–10.2)
CHLORIDE SPEC-SCNC: 98 MMOL/L (ref 98–107)
CO2 SERPL-SCNC: 32 MMOL/L (ref 22–30)
CREAT BLD-SCNC: 0.8 MG/DL (ref 0.52–1.04)
CREATININE CLEARANCE ESTIMATED: 42 ML/MIN (ref 50–200)
ESTIMATED GLOMERULAR FILT RATE: 68 ML/MIN (ref 60–?)
GFR (AFRICAN AMERICAN): 82 ML/MIN (ref 60–?)
GLOBULIN SER CALC-MCNC: 2.8 G/DL (ref 1.3–3.2)
GLUCOSE: 102 MG/DL (ref 74–100)
HCT VFR BLD CALC: 36.6 % (ref 37–47)
HGB BLD-MCNC: 11.8 G/DL (ref 12.2–16.2)
INR PPP: 0.96 (ref 0.9–1.1)
MCHC RBC-ENTMCNC: 32.1 G/DL (ref 31.8–35.4)
MCV RBC: 101.4 FL (ref 81–99)
MEAN CORPUSCULAR HEMOGLOBIN: 32.6 PG (ref 27–31.2)
PLATELET # BLD: 279 K/MM3 (ref 142–424)
POTASSIUM: 4.4 MMOL/L (ref 3.5–5.1)
PROT SERPL-MCNC: 6.8 G/DL (ref 6.3–8.2)
PT BLD: 10.8 SECONDS (ref 10.1–12.5)
RBC # BLD AUTO: 3.61 M/MM3 (ref 4.2–5.4)
SODIUM SPEC-SCNC: 133 MMOL/L (ref 136–145)
WBC # BLD AUTO: 6.7 K/MM3 (ref 4.8–10.8)

## 2024-09-30 PROCEDURE — 96361 HYDRATE IV INFUSION ADD-ON: CPT

## 2024-09-30 PROCEDURE — 73060 X-RAY EXAM OF HUMERUS: CPT

## 2024-09-30 PROCEDURE — 12013 RPR F/E/E/N/L/M 2.6-5.0 CM: CPT

## 2024-09-30 PROCEDURE — 72131 CT LUMBAR SPINE W/O DYE: CPT

## 2024-09-30 PROCEDURE — 72125 CT NECK SPINE W/O DYE: CPT

## 2024-09-30 PROCEDURE — 70496 CT ANGIOGRAPHY HEAD: CPT

## 2024-09-30 PROCEDURE — 96374 THER/PROPH/DIAG INJ IV PUSH: CPT

## 2024-09-30 PROCEDURE — 85025 COMPLETE CBC W/AUTO DIFF WBC: CPT

## 2024-09-30 PROCEDURE — 73030 X-RAY EXAM OF SHOULDER: CPT

## 2024-09-30 PROCEDURE — 90715 TDAP VACCINE 7 YRS/> IM: CPT

## 2024-09-30 PROCEDURE — 72128 CT CHEST SPINE W/O DYE: CPT

## 2024-09-30 PROCEDURE — 80053 COMPREHEN METABOLIC PANEL: CPT

## 2024-09-30 PROCEDURE — 85610 PROTHROMBIN TIME: CPT

## 2024-09-30 PROCEDURE — 99285 EMERGENCY DEPT VISIT HI MDM: CPT

## 2024-09-30 PROCEDURE — 70498 CT ANGIOGRAPHY NECK: CPT

## 2024-09-30 PROCEDURE — 70450 CT HEAD/BRAIN W/O DYE: CPT

## 2024-09-30 PROCEDURE — 90471 IMMUNIZATION ADMIN: CPT

## 2024-09-30 NOTE — ED_ITS
Discharge Plan    
Disposition    
Patient Disposition: Home, Self-Care    
    
Condition: Good    
    
Prescriptions    
Prescriptions:    
New    
  cephalexin 500 mg capsule     
   500 mg PO BID 7 Days Qty: 14 0RF    
    
No Action    
  isosorbide mononitrate 30 mg tablet extended release 24 hr     
   30 mg PO DAILY     
  ezetimibe 10 mg tablet     
   10 mg PO DAILY     
   Patient Comments:    
   TAKE 1 TABLET BY MOUTH ONCE DAILY    
  hydrochlorothiazide 25 mg tablet     
   25 mg PO DAILY     
   Patient Comments:    
   TAKE 1 TABLET BY MOUTH ONCE DAILY    
  niacin 50 mg tablet     
   50 mg PO DAILY     
  furosemide 20 mg tablet     
   20 mg PO DAILY Qty: 7 0RF    
  potassium chloride 10 mEq tablet extended release     
     PO      
  escitalopram oxalate [Lexapro] 10 mg tablet     
   10 mg PO DAILY Qty: 30 3RF    
  amlodipine 5 MG tablet     
   5 mg PO DAILY     
    
Referrals    
Follow up/Referrals:    
Deondre Aguirre MD [Primary Care Provider] - See instructions    
    
Activity Restrictions/Add. Instructions    
Additional Instructions/Restrictions:    
Please return to PCP [facility] or ER for suture removal in 5 days.  Return sooner for   
any redness drainage or swelling.  Please return emergently for any protracted   
headache protracted vomiting change in level of consciousness.  I have sent a   
prescription into your pharmacy.  Please take till gone.    
    
Clinical Impressions    
Clinical Impression:    
 Laceration    
    
Fall    
Qualifiers:    
 Encounter type: initial encounter Qualified Code(s): W19.XXXA - Unspecified   
fall, initial encounter    
    
CHI (closed head injury)    
Qualifiers:    
 Encounter type: initial encounter Qualified Code(s): S09.90XA - Unspecified   
injury of head, initial encounter    
    
    
Instructions    
Patient Instructions:  DI for Laceration Repair, DI for Closed Head Injury    
    
Print Language    
Print Language: English    
    
Discharge    
ED Provider: Abdiaziz Walters    
    
    
General Adult HPI    
<BARRON Malcolm - Last Filed: 09/30/24 23:07>    
General    
Chief complaint: Fall    
Stated complaint: AO 09/30/24 1730 Laceration forehead,left leg,hand    
Time Seen by Provider: 09/30/24 18:31    
Mode of Arrival: Wheelchair    
Source of Information: Patient    
Limitations: No Limitations    
Description of Symptoms (Recalled from ER Triage Doc. by RN): Patient reports   
tripping on a block of wood and falling. Patient hit her head, no LOC, lac   
noticed to left shin and right pinky finger. Patient is A&O.    
History of Present Illness    
HPI narrative:     
Patient presents for evaluation of a fall.  Patient tripped over a log in her   
driveway falling striking her head and also at home knocking her unconscious.    
Her daughter was present at the time of the injury and saw it happen.  Patient   
was unconscious for under a minute.  On arrival patient reports slight headache   
but denies any other bony injury other than her left shoulder.  She suffered a   
laceration to her forehead and several skin tears.  She denies any fever chills   
headache nausea vomiting change in level of consciousness.  Patient is severely   
hard of hearing and is supposed to wear hearing aids but does not have them in   
currently.    
Related Data    
                                Home Medications    
    
    
    
?Medication ?Instructions ?Recorded ?Confirmed    
     
amlodipine 5 mg tablet 5 mg PO DAILY Hypertension 01/24/22 01/10/24    
     
ezetimibe 10 mg tablet 10 mg PO DAILY Cholesterol 02/07/23 01/10/24    
     
isosorbide mononitrate 30 mg 30 mg PO DAILY Hypertension 02/07/23 01/10/24    
    
tablet,extended release 24 hr       
     
hydrochlorothiazide 25 mg tablet 25 mg PO DAILY 07/13/23 01/10/24    
     
niacin 50 mg tablet 50 mg PO DAILY 10/11/23 01/10/24    
     
potassium chloride 10 mEq meq PO 01/10/24 01/10/24    
    
tablet,extended release       
    
    
                                  Previous Rx's    
    
    
    
?Medication ?Instructions ?Recorded    
     
furosemide 20 mg tablet 20 mg PO DAILY #7 tabs 10/11/23    
     
escitalopram oxalate 10 mg tablet 10 mg PO DAILY #30 tabs 08/12/24    
    
(Lexapro)      
     
cephalexin 500 mg capsule 500 mg PO BID 7 days #14 caps 09/30/24    
    
    
    
                                    Allergies    
    
    
    
Allergy/AdvReac Type Severity Reaction Status Date / Time    
     
aminophylline Allergy Intermediate ELEVATED Verified 01/10/24 10:05    
    
   HEART RATE      
     
codeine Allergy Intermediate ITCHING Verified 01/10/24 10:05    
     
meperidine Allergy Intermediate Unknown Verified 01/10/24 10:05    
    
   allergy      
    
   reaction      
     
Statins-HMG-CoA Reductase AdvReac  Dizziness Verified 01/10/24 10:05    
    
Inhibitor         
    
    
    
    
<BARRON Malcolm - Last Filed: 09/30/24 23:07>    
ROS Obtained: Yes Systems reviewed as appropriate & no additional complaints   
except as documented    
    
Physical Exam    
<BARRON Malcolm - Last Filed: 09/30/24 23:07>    
General    
General appearance: alert and in no apparent distress    
Head    
Head exam: other (Laceration to the right upper forehead no bony deformity   
noted.)    
Eye    
Eye exam: Present normal appearance, PERRL and EOMI    
ENT    
ENT exam: Present normal exam, normal oropharynx and mucous membranes moist    
Neck    
Neck exam: Present normal inspection, full ROM and trachea midline; Absent   
tenderness or lymphadenopathy    
Chest    
Chest inspection: Present normal inspection and symmetric chest wall rise    
Respiratory    
Respiratory exam: Present normal lung sounds bilaterally    
Cardiovascular    
Cardiovascular exam: Present regular rate    
Neurological Exam    
Neurological exam: Present alert, oriented X3, CN II-XII intact and normal gait;  
Absent motor sensory deficit    
Psychiatric    
Psychiatric exam: Present normal affect and normal mood    
    
Medical Decision Making    
<BARRON Malcolm - Last Filed: 09/30/24 23:07>    
Medical Records    
Medical records reviewed: Yes I reviewed the patient's medical records.    
Screening:     
Per USPSTF and CDC recommendations, given the prevalence of disease in our   
region, it is our hospital?s policy to screen for HIV and viral Hepatitis for   
all patients aged 18 and over and those with ongoing risk factors.     
    
Dony Inquiry    
Pt receiving controlled substance: No    
Vital Signs:     
    
                                            
    
    
    
 09/30/24    
18:21 09/30/24    
20:20 09/30/24    
20:30    
     
Temperature 98.1 F      
     
Temperature Source Oral      
     
Pulse Rate  65 64    
     
Pulse Rate [Radial] 60      
     
Respiratory Rate 16      
     
Blood Pressure  148/100 H 170/86 H    
     
Blood Pressure [Right Arm] 148/66 H      
     
Blood Pressure Mean [Right Arm] 93      
     
Blood Pressure Source       
     
Blood Pressure Source [Right Arm] Automatic Cuff      
     
Blood Pressure Position       
     
Blood Pressure Position [Right Arm] Sitting      
     
02 Sat by Pulse Oximetry 96 94 L 92 L    
     
Oxygen Delivery Method Room Air      
    
    
    
    
 09/30/24    
21:47    
     
Temperature 97.3 F L    
     
Temperature Source Oral    
     
Pulse Rate 64    
     
Pulse Rate [Radial]     
     
Respiratory Rate 16    
     
Blood Pressure 170/86 H    
     
Blood Pressure [Right Arm]     
     
Blood Pressure Mean [Right Arm]     
     
Blood Pressure Source Automatic Cuff    
     
Blood Pressure Source [Right Arm]     
     
Blood Pressure Position Supine    
     
Blood Pressure Position [Right Arm]     
     
02 Sat by Pulse Oximetry     
     
Oxygen Delivery Method Room Air    
    
    
    
    
Lab Data    
Lab results reviewed: Yes I reviewed the patient's lab results.    
    
                                   Lab Results    
    
09/30/24 18:57: WBC 6.7, RBC 3.61 L, Hgb 11.8 L, Hct 36.6 L, .4 H, MCH   
32.6 H, MCHC 32.1, RDW 14.0, Plt Count 279, MPV 8.3, Neut % (Auto) 51.2, Lymph %  
(Auto) 40.0, Mono % (Auto) 5.1, Eos % (Auto) 2.9, Baso % (Auto) 0.8, Neut #   
(Auto) 3.4, Lymph # (Auto) 2.7, Mono # (Auto) 0.4, Eos # (Auto) 0.2, Baso #   
(Auto) 0.1, PT 10.8, INR 0.96, Sodium 133 L, Potassium 4.4, Chloride 98, Carbon   
Dioxide 32 H, Anion Gap 7.4, BUN 21 H, Creatinine 0.80, Estimated Creat Clear   
42, Estimated GFR 68, Est GFR (African Amer) 82, Glucose 102 H, Calcium 9.8,   
Total Bilirubin 0.4, AST 32, ALT 19, Alkaline Phosphatase 60, Total Protein 6.8,  
Albumin 4.0, Globulin 2.8, Albumin/Globulin Ratio 1.4    
    
    
    
    
                                                        09/30/24 18:57              
    
                                                        09/30/24 18:57              
    
Orders (Tests/Meds):     
    
                                 ED MEDICATIONS    
    
    
    
    
Discontinued Medications    
    
    
    
    
Generic Name Dose Route Start Last Admin    
    
  Trade Name Marianoq  PRN Reason Stop Dose Admin    
     
Acetaminophen  1,000 mg  09/30/24 18:41  09/30/24 19:04    
    
  Acetaminophen 1,000mg/100ml Vial  IV  09/30/24 18:42  1,000 mg    
    
  ONCE ONE   Administration    
     
Cephalexin HCl  500 mg  09/30/24 19:20  09/30/24 20:08    
    
  Cephalexin 500mg Capsule  PO  09/30/24 19:21  500 mg    
    
  ONCE ONE   Administration    
     
Lactated Ringer's  1,000 mls @ 999 mls/hr  09/30/24 18:41  09/30/24 19:03    
    
  Lactated Ringer's 1000 Ml Bag  IV  09/30/24 19:41  999 mls/hr    
    
  .Q1H1M ONE   Administration    
     
Iopamidol  80 ml  09/30/24 19:26  09/30/24 19:27    
    
  Iopamidol-370 (76%);100ml Bottle  IV  09/30/24 19:27  80 ml    
    
  ONCE ONE   Administration    
     
Lidocaine/Epinephrine  20 ml  09/30/24 19:20  09/30/24 20:06    
    
  Lidocaine 1% W/Epi 1:100,000 20ml Vial  SQ  09/30/24 19:21  20 ml    
    
  ONCE ONE   Administration    
     
Sodium Chloride  10 ml  09/30/24 19:26  09/30/24 19:27    
    
  Sodium Chloride 0.9% 10ml Syr (Rad Only)  IV  09/30/24 19:27  10 ml    
    
  ONCE ONE   Administration    
     
Sodium Chloride  50 ml  09/30/24 19:26  09/30/24 19:27    
    
  0.9 % Sodium Chloride 50 Ml Vial  IV  09/30/24 19:27  50 ml    
    
  ONCE ONE   Administration    
     
Tetanus/Reduced Diphtheria/Acell Pertussis  0.5 ml  09/30/24 19:20  09/30/24   
20:08    
    
  Tet/Diphth/Pert-Adult 0.5ml Syringe  IM  09/30/24 19:21  0.5 ml    
    
  .ONCE ONE   Administration    
    
    
                                     ORDERS    
    
    
    
 Category Date Time Status    
     
 CT angio head Stat Cat Scan  09/30/24 18:42 Completed    
     
 CT angio neck Stat Cat Scan  09/30/24 18:42 Completed    
     
 CT cervical spine wo con Stat Cat Scan  09/30/24 18:42 Completed    
     
 CT head/brain wo con Stat Cat Scan  09/30/24 18:42 Completed    
     
 CT lumbar spine wo con Stat Cat Scan  09/30/24 18:42 Completed    
     
 CT thoracic spine wo con Stat Cat Scan  09/30/24 18:42 Completed    
     
 Humerus XR left [XR humerus LT] Stat Exams  09/30/24 18:41 Completed    
     
 Shoulder XR left minimum 2 views [XR shoulder LT min 2V Exams  09/30/24 18:41   
Completed    
    
 ] Stat       
     
 CBC w/Auto Diff [Complete Blood Count Auto Diff] Stat Lab  09/30/24 18:57   
Completed    
     
 CMP [Comprehensive Metabolic Panel] Stat Lab  09/30/24 18:57 Completed    
     
 INR [Prothrombin Time INR] Stat Lab  09/30/24 18:57 Completed    
    
    
    
    
Medical Decision Narrative:     
In summary patient is a 89-year-old female who presents to the emergency   
department for evaluation of a fall and laceration with positive loss of   
consciousness.  Patient is hemodynamically stable upon arrival, afebrile.    
Physical exam is remarkable for 4 cm stellate laceration to the right upper   
forehead with no bony deformity noted.  No nuchal rigidity.  No C-spine T-spine   
or L-spine tenderness.  Glascow coma score is currently 15 although the patient   
is severely hard of hearing she does respond appropriately.  She has several   
skin tears including right fourth finger left hand left medial calf with no   
active hemorrhage.  Patient is on aspirin.  She is a retired CNA.  Differential   
diagnosis includes simple laceration versus closed head injury versus   
intercranial bleed versus C-spine injury versus fracture etc.  Initial workup   
will be conducted with hematologic labs, trauma CT scans of the head cervical   
thoracic lumbar spines CTA of the head and neck plain films of the left shoulder  
and humerus.  Initial interventions include crystalloid bolus Tylenol Tdap.    
Initial workup reviewed by me shows that her hematologic labs are nonactionable   
my informal interpretation of her CT imaging shows no acute intracranial injury,  
no C-spine fractures although she has severe degenerative spinal disease and my   
informal interpretation of her CTA shows no intracranial bleed but she does have  
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   
of..  Upon repeat evaluation patient's Glascow coma score remains 15..  Given   
this forehead laceration was repaired primarily with twelve 6.0 nylon sutures in  
an interrupted fashion.  Given this patient is appropriate for discharge with   
prescription for Keflex and strict return precautions for closed head injury.    
    
    
    
<Abdiaziz Walters MD - Last Filed: 10/01/24 16:49>    
Vital Signs:     
    
                                            
    
    
    
 09/30/24    
18:21 09/30/24    
20:20 09/30/24    
20:30    
     
Temperature 98.1 F      
     
Temperature Source Oral      
     
Pulse Rate  65 64    
     
Pulse Rate [Radial] 60      
     
Respiratory Rate 16      
     
Blood Pressure  148/100 H 170/86 H    
     
Blood Pressure [Right Arm] 148/66 H      
     
Blood Pressure Mean [Right Arm] 93      
     
Blood Pressure Source       
     
Blood Pressure Source [Right Arm] Automatic Cuff      
     
Blood Pressure Position       
     
Blood Pressure Position [Right Arm] Sitting      
     
02 Sat by Pulse Oximetry 96 94 L 92 L    
     
Oxygen Delivery Method Room Air      
    
    
    
    
 09/30/24    
21:47    
     
Temperature 97.3 F L    
     
Temperature Source Oral    
     
Pulse Rate 64    
     
Pulse Rate [Radial]     
     
Respiratory Rate 16    
     
Blood Pressure 170/86 H    
     
Blood Pressure [Right Arm]     
     
Blood Pressure Mean [Right Arm]     
     
Blood Pressure Source Automatic Cuff    
     
Blood Pressure Source [Right Arm]     
     
Blood Pressure Position Supine    
     
Blood Pressure Position [Right Arm]     
     
02 Sat by Pulse Oximetry     
     
Oxygen Delivery Method Room Air    
    
    
    
    
Lab Data    
    
                                   Lab Results    
    
09/30/24 18:57: WBC 6.7, RBC 3.61 L, Hgb 11.8 L, Hct 36.6 L, .4 H, MCH   
32.6 H, MCHC 32.1, RDW 14.0, Plt Count 279, MPV 8.3, Neut % (Auto) 51.2, Lymph %  
(Auto) 40.0, Mono % (Auto) 5.1, Eos % (Auto) 2.9, Baso % (Auto) 0.8, Neut #   
(Auto) 3.4, Lymph # (Auto) 2.7, Mono # (Auto) 0.4, Eos # (Auto) 0.2, Baso #   
(Auto) 0.1, PT 10.8, INR 0.96, Sodium 133 L, Potassium 4.4, Chloride 98, Carbon   
Dioxide 32 H, Anion Gap 7.4, BUN 21 H, Creatinine 0.80, Estimated Creat Clear   
42, Estimated GFR 68, Est GFR (African Amer) 82, Glucose 102 H, Calcium 9.8,   
Total Bilirubin 0.4, AST 32, ALT 19, Alkaline Phosphatase 60, Total Protein 6.8,  
Albumin 4.0, Globulin 2.8, Albumin/Globulin Ratio 1.4    
    
    
    
Orders (Tests/Meds):     
    
                                 ED MEDICATIONS    
    
    
    
    
Discontinued Medications    
    
    
    
    
Generic Name Dose Route Start Last Admin    
    
  Trade Name Freq  PRN Reason Stop Dose Admin    
     
Acetaminophen  1,000 mg  09/30/24 18:41  09/30/24 19:04    
    
  Acetaminophen 1,000mg/100ml Vial  IV  09/30/24 18:42  1,000 mg    
    
  ONCE ONE   Administration    
     
Cephalexin HCl  500 mg  09/30/24 19:20  09/30/24 20:08    
    
  Cephalexin 500mg Capsule  PO  09/30/24 19:21  500 mg    
    
  ONCE ONE   Administration    
     
Lactated Ringer's  1,000 mls @ 999 mls/hr  09/30/24 18:41  09/30/24 19:03    
    
  Lactated Ringer's 1000 Ml Bag  IV  09/30/24 19:41  999 mls/hr    
    
  .Q1H1M ONE   Administration    
     
Iopamidol  80 ml  09/30/24 19:26  09/30/24 19:27    
    
  Iopamidol-370 (76%);100ml Bottle  IV  09/30/24 19:27  80 ml    
    
  ONCE ONE   Administration    
     
Lidocaine/Epinephrine  20 ml  09/30/24 19:20  09/30/24 20:06    
    
  Lidocaine 1% W/Epi 1:100,000 20ml Vial  SQ  09/30/24 19:21  20 ml    
    
  ONCE ONE   Administration    
     
Sodium Chloride  10 ml  09/30/24 19:26  09/30/24 19:27    
    
  Sodium Chloride 0.9% 10ml Syr (Rad Only)  IV  09/30/24 19:27  10 ml    
    
  ONCE ONE   Administration    
     
Sodium Chloride  50 ml  09/30/24 19:26  09/30/24 19:27    
    
  0.9 % Sodium Chloride 50 Ml Vial  IV  09/30/24 19:27  50 ml    
    
  ONCE ONE   Administration    
     
Tetanus/Reduced Diphtheria/Acell Pertussis  0.5 ml  09/30/24 19:20  09/30/24   
20:08    
    
  Tet/Diphth/Pert-Adult 0.5ml Syringe  IM  09/30/24 19:21  0.5 ml    
    
  .ONCE ONE   Administration    
    
    
                                     ORDERS    
    
    
    
 Category Date Time Status    
     
 CT angio head Stat Cat Scan  09/30/24 18:42 Completed    
     
 CT angio neck Stat Cat Scan  09/30/24 18:42 Completed    
     
 CT cervical spine wo con Stat Cat Scan  09/30/24 18:42 Completed    
     
 CT head/brain wo con Stat Cat Scan  09/30/24 18:42 Completed    
     
 CT lumbar spine wo con Stat Cat Scan  09/30/24 18:42 Completed    
     
 CT thoracic spine wo con Stat Cat Scan  09/30/24 18:42 Completed    
     
 Humerus XR left [XR humerus LT] Stat Exams  09/30/24 18:41 Completed    
     
 Shoulder XR left minimum 2 views [XR shoulder LT min 2V Exams  09/30/24 18:41   
Completed    
    
 ] Stat       
     
 CBC w/Auto Diff [Complete Blood Count Auto Diff] Stat Lab  09/30/24 18:57   
Completed    
     
 CMP [Comprehensive Metabolic Panel] Stat Lab  09/30/24 18:57 Completed    
     
 INR [Prothrombin Time INR] Stat Lab  09/30/24 18:57 Completed    
    
    
    
    
Medical Decision Narrative:     
In summary patient is a 89-year-old female who presents to the emergency   
department for evaluation of a fall and laceration with positive loss of   
consciousness.  Patient is hemodynamically stable upon arrival, afebrile.    
Physical exam is remarkable for 4 cm stellate laceration to the right upper   
forehead with no bony deformity noted.  No nuchal rigidity.  No C-spine T-spine   
or L-spine tenderness.  Glascow coma score is currently 15 although the patient   
is severely hard of hearing she does respond appropriately.  She has several   
skin tears including right fourth finger left hand left medial calf with no   
active hemorrhage.  Patient is on aspirin.  She is a retired CNA.  Differential   
diagnosis includes simple laceration versus closed head injury versus   
intercranial bleed versus C-spine injury versus fracture etc.  Initial workup   
will be conducted with hematologic labs, trauma CT scans of the head cervical   
thoracic lumbar spines CTA of the head and neck plain films of the left shoulder  
and humerus.  Initial interventions include crystalloid bolus Tylenol Tdap.    
Initial workup reviewed by me shows that her hematologic labs are nonactionable   
my informal interpretation of her CT imaging shows no acute intracranial injury,  
no C-spine fractures although she has severe degenerative spinal disease and my   
informal interpretation of her CTA shows no intracranial bleed but she does have  
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   
of..  Upon repeat evaluation patient's Glascow coma score remains 15..  Given   
this forehead laceration was repaired primarily with twelve 6.0 nylon sutures in  
an interrupted fashion.  Given this patient is appropriate for discharge with   
prescription for Keflex and strict return precautions for closed head injury.    
    
I was consulted by the REYNA, and we discussed the complexity of the problems   
being addressed. I approved the treatment and management plan for this patient's  
care in the Emergency Department, thus performing a substantive portion of the   
medical decision making.    
    
Abdiaziz Walters MD    
    
Procedures    
<BARRON Malcolm - Last Filed: 09/30/24 23:07>    
Laceration    
Laceration 1:     
      Site: face (Right upper forehead)    
      Side (If applicable): right    
      Size (cm): 4    
      Description: stellate    
      Depth: simple, single layer    
      Local Anesthetic: lidocaine 1% and with epi    
      Amount of anesthesia used (mL): 10    
      Pre-repair: wound explored, irrigated extensively and deep structures   
intact    
      Skin layer closed with: nylon    
      Size (cm): 6-0    
      Number of sutures: 12    
      Technique: simple, interrupted    
    
Critical Care    
<BARRON Malcolm - Last Filed: 09/30/24 23:07>    
Critical Care Time    
Critical Care Time: No

## 2024-09-30 NOTE — CT_ITS
PROCEDURE INFORMATION:  
Exam: CT Cervical Spine Without Contrast  
Exam date and time: 9/30/2024 7:29 PM  
Age: 89 years old  
Clinical indication: Injury or trauma; Fall; Blunt trauma; Additional  
info:  
Trauma, critical injury suspected  
 
TECHNIQUE:  
Imaging protocol: Computed tomography of the cervical spine without  
contrast.  
Radiation optimization: All CT scans at this facility use at least  
one of these  
dose optimization techniques: automated exposure control; mA and/or  
kV  
adjustment per patient size (includes targeted exams where dose is  
matched to  
clinical indication); or iterative reconstruction.  
 
COMPARISON:  
CT CERVICAL SPINE WO CON 4/13/2023 2:06 PM  
 
FINDINGS:  
Bones: Bone mineralization is decreased, suggestive of osteopenia. No  
acute  
cervical spine fracture is identified. There is mild anterolisthesis  
of C3 on  
C4, C4 on C5, C5 on C6, and C6 on C7 due to severe facet arthropathy.  
Severe  
degenerative changes of the cervical spine are present. There is no  
severe  
spinal canal stenosis. Multilevel neural foraminal narrowing from  
uncinate  
spurring and facet arthropathy is noted.  
 
Lungs: Minor scarring is present in the lung apices. A nonspecific 4  
mm  
pulmonary nodule is noted in the right lung apex.For patients at low  
risk  
(minimal or absent history of smoking and of other known risk  
factors), no  
routine follow-up is indicated. For patients at high risk (history of  
smoking  
or of other known risk factors), consider optional CT Chest at 12  
months.  
(Reference: Paula)  
 
Vasculature: Atherosclerotic calcifications are present at the  
carotid  
bifurcations.  
Soft tissues: Unremarkable.  
 
IMPRESSION:  
1.   No acute cervical spine fracture.  
2.   Chronic findings as discussed above.  
 
REFERENCES:  
Paula H, et al. Guidelines for Management of Incidental Pulmonary  
Nodules  
Detected on CT Images: From the Fleischner Society 2017. Radiology.  
2017;284(1):228-243.  
 
Electronically signed by Wilmar Freeman MD at 09/30/2024 20:20

## 2024-09-30 NOTE — CT_ITS
PROCEDURE INFORMATION:  
Exam: CT Thoracic Spine Without Contrast  
Exam date and time: 9/30/2024 7:32 PM  
Age: 89 years old  
Clinical indication: Injury or trauma; Fall; Blunt trauma (contusions  
or  
hematomas); Additional info: Trauma, critical injury suspected  
 
TECHNIQUE:  
Imaging protocol: Computed tomography of the thoracic spine without  
contrast.  
Radiation optimization: All CT scans at this facility use at least  
one of these  
dose optimization techniques: automated exposure control; mA and/or  
kV  
adjustment per patient size (includes targeted exams where dose is  
matched to  
clinical indication); or iterative reconstruction.  
 
COMPARISON:  
CT THORACIC SPINE WO CON 4/13/2023 2:10 PM  
 
FINDINGS:  
Bones/joints: No acute fracture. Age-related degenerative changes.  
 
Soft tissues: Unremarkable.  
 
IMPRESSION:  
No evidence for acute fracture.  
 
Electronically signed by Smooth Cuellar MD at 09/30/2024 20:26

## 2024-09-30 NOTE — XR_ITS
PROCEDURE INFORMATION:  
Exam: XR Left Humerus  
Exam date and time: 9/30/2024 8:29 PM  
Age: 89 years old  
Clinical indication: Injury or trauma; Fall; Blunt trauma (contusions  
or  
hematomas); Arm, upper; Left; Additional info: Fall with loss of  
consciousness  
 
TECHNIQUE:  
Imaging protocol: Radiologic exam of the left humerus.  
Views: 2 or more views.  
 
COMPARISON:  
CR XR HUMERUS LT 4/13/2023 3:01 PM  
 
FINDINGS:  
Bones/joints: There is no acute fracture or dislocation. Moderate  
degenerative  
changes of the left shoulder are noted.  
Soft tissues: Normal.  
 
IMPRESSION:  
1.   No acute abnormality.  
2.   Chronic findings as discussed above.  
 
Electronically signed by Wilmar Freeman MD at 09/30/2024 21:01

## 2024-09-30 NOTE — CT_ITS
PROCEDURE INFORMATION:  
Exam: CTA Head With Contrast, Arteriography  
Exam date and time: 9/30/2024 7:38 PM  
Age: 89 years old  
Clinical indication: Injury or trauma; Fall; Blunt trauma; Head;  
Additional  
info: Trauma, critical injury suspected  
 
TECHNIQUE:  
Imaging protocol: Computed tomographic angiography of the head with  
contrast.  
Exam focused on the arteries.  
3D rendering (Not supervised by radiologist): MIP and/or 3D  
reconstructed  
images were created by the technologist.  
Radiation optimization: All CT scans at this facility use at least  
one of these  
dose optimization techniques: automated exposure control; mA and/or  
kV  
adjustment per patient size (includes targeted exams where dose is  
matched to  
clinical indication); or iterative reconstruction.  
Contrast material: ISOVUE 370; Contrast volume: 80 ml; Contrast  
route:  
INTRAVENOUS (IV);   
 
COMPARISON:  
CT ANGIO HEAD 9/30/2024 7:38 PM  
 
FINDINGS:  
 
ANTERIOR CIRCULATION:  
Right internal carotid artery: Atherosclerotic calcifications are  
seen  
involving the right cavernous internal carotid artery. Mild stenosis  
is  
present.  
Right middle cerebral artery: No occlusion or significant stenosis.  
No  
aneurysm.   
Right anterior cerebral artery: No occlusion or significant stenosis.  
No  
aneurysm.   
 
Left internal carotid artery: Atherosclerotic calcifications are seen  
involving  
the left cavernous internal carotid artery. Mild stenosis is present.  
Left middle cerebral artery: No occlusion or significant stenosis. No  
aneurysm.  
 
Left anterior cerebral artery: No occlusion or significant stenosis.  
No  
aneurysm.   
 
POSTERIOR CIRCULATION:  
Right vertebral artery: No occlusion or significant stenosis. No  
aneurysm.   
Left vertebral artery: No occlusion or significant stenosis. No  
aneurysm.   
Basilar artery: There is mild stenosis of the mid basilar artery.  
Right posterior cerebral artery: There is persistent fetal origin of  
the right  
posterior cerebral artery.  
Left posterior cerebral artery: There is severe stenosis of the left  
P1  
segment.  
 
Veins: The venous sinuses are patent.  
 
Brain: No definite mass, cerebral edema, or midline shift.  
Cerebral ventricles: No ventriculomegaly.  
Bones/joints: Unremarkable. No acute fracture.  
Soft tissues: Unremarkable.  
 
IMPRESSION:  
1.   No large vessel occlusion  
2.   Severe stenosis of the proximal left PCA  
 
Electronically signed by Wilmar Freeman MD at 09/30/2024 20:37

## 2024-09-30 NOTE — CT_ITS
PROCEDURE INFORMATION:  
Exam: CT Lumbar Spine Without Contrast  
Exam date and time: 9/30/2024 7:35 PM  
Age: 89 years old  
Clinical indication: Injury or trauma; Fall; Blunt trauma (contusions  
or  
hematomas); Additional info: Trauma, critical injury suspected  
 
TECHNIQUE:  
Imaging protocol: Computed tomography of the lumbar spine without  
contrast.  
Radiation optimization: All CT scans at this facility use at least  
one of these  
dose optimization techniques: automated exposure control; mA and/or  
kV  
adjustment per patient size (includes targeted exams where dose is  
matched to  
clinical indication); or iterative reconstruction.  
 
COMPARISON:  
CT LUMBAR SPINE WO CON 4/13/2023 2:18 PM  
 
FINDINGS:  
Bones/joints: 4 mm anterolisthesis at L3-L4. Severe degenerative disc  
space  
loss at T12-L1 and L1-L2 and L5-S1. Severe bilateral facet  
arthropathy L3-L4.  
Probable severe spinal stenosis at the level of L3-L4. There is also  
likely  
severe spinal stenosis at the level of L2-L3. Due to facet  
arthropathy disc  
bulges and ligamentum flavum hypertrophy. 2 mm anterolisthesis L4-L5.  
 
Soft tissues: Unremarkable.  
 
IMPRESSION:  
1.   Severe bilateral facet arthropathy L3-L4. Probable severe spinal  
stenosis  
at the level of L3-L4. There is also likely severe spinal stenosis at  
the level  
of L2-L3. Due to facet arthropathy disc bulges and ligamentum flavum  
hypertrophy. Consider MRI for further assessment.  
2.   Severe degenerative disc space loss at T12-L1 and L1-L2 and  
L5-S1.  
3.   No definite fractures.  
 
Electronically signed by Smooth Cuellar MD at 09/30/2024 20:24

## 2024-09-30 NOTE — XR_ITS
PROCEDURE INFORMATION:  
Exam: XR Left Shoulder  
Exam date and time: 9/30/2024 8:31 PM  
Age: 89 years old  
Clinical indication: Injury or trauma; Fall; Blunt trauma (contusions  
or  
hematomas); Shoulder; Left; Additional info: Fall with loss of  
consciousness  
 
TECHNIQUE:  
Imaging protocol: Radiologic exam of the left shoulder.  
Views: 2 or more views.  
 
COMPARISON:  
CR XR SHOULDER LT MIN 2V 4/13/2023 3:01 PM  
 
FINDINGS:  
Bones/joints: There is no acute fracture or dislocation. Moderate  
degenerative  
changes of the left acromioclavicular and glenohumeral joints are  
noted.  
Soft tissues: Normal.  
 
IMPRESSION:  
1.   No acute abnormality.  
2.   Chronic findings as discussed above.  
 
Electronically signed by Wilmar Freeman MD at 09/30/2024 20:52

## 2024-09-30 NOTE — CT_ITS
PROCEDURE INFORMATION:  
Exam: CTA Neck With Contrast  
Exam date and time: 9/30/2024 7:38 PM  
Age: 89 years old  
Clinical indication: Injury or trauma; Fall; Blunt trauma; Head;  
Additional  
info: Trauma, critical injury suspected  
 
TECHNIQUE:  
Imaging protocol: Computed tomographic angiography of the neck with  
contrast.  
Exam focused on the cervical segments of the vasculature.  
3D rendering (Not supervised by radiologist): MIP and/or 3D  
reconstructed  
images were created by the technologist.  
Radiation optimization: All CT scans at this facility use at least  
one of these  
dose optimization techniques: automated exposure control; mA and/or  
kV  
adjustment per patient size (includes targeted exams where dose is  
matched to  
clinical indication); or iterative reconstruction.  
Contrast material: ISOVUE 370; Contrast volume: 80 ml; Contrast  
route:  
INTRAVENOUS (IV);   
 
COMPARISON:  
CT CERVICAL SPINE WO CON 9/30/2024 7:29 PM  
 
FINDINGS:  
Right common carotid artery: No stenosis. No dissection or occlusion.  
Right internal carotid artery: Calcified and noncalcified plaque is  
noted at  
the right internal carotid artery origin. This is causing less than  
25%  
stenosis.  
Right external carotid artery: No occlusion or stenosis of the  
origin.   
 
Left common carotid artery: There is moderate atherosclerotic  
narrowing of the  
left common carotid artery origin. Stenosis might be overestimated  
due to  
associated motion artifact.  
Left internal carotid artery: Calcified and noncalcified plaque is  
present at  
the left internal carotid artery origin. This is causing  
approximately 60%  
stenosis. The remaining left cervical ICA is unremarkable.  
Left external carotid artery: No occlusion or stenosis of the origin.  
 
 
Right vertebral artery: There is mild stenosis of the right vertebral  
artery at  
the C4-C5 and C5-C6 levels due to extrinsic compression from uncinate  
spurring.  
Left vertebral artery: No stenosis. No dissection or occlusion.  
 
Aorta: Atherosclerotic calcifications are noted within the aortic  
arch.  
 
Lymph nodes: There appears to be a partially imaged enlarged right  
hilar lymph  
node, measuring approximately 2.0 x 1.5 cm. Calcified left hilar and  
subcarinal  
lymph nodes are present, likely due to old granulomatous disease.  
Soft tissues: Normal. No significant soft tissue swelling.  
 
Bones/joints: Severe degenerative changes of the cervical spine are  
present.  
 
Lungs: Calcified granulomas are present in the left upper lobe.  
Tree-in-bud  
opacities and tiny pulmonary nodules are noted in the right upper  
lobe.For  
patients at low risk (minimal or absent history of smoking and of  
other known  
risk factors), no routine follow-up is indicated. For patients at  
high risk  
(history of smoking or of other known risk factors), consider  
optional CT Chest  
at 12 months. (Reference: Paula)  
 
IMPRESSION:  
1.   Proximally 60% stenosis of the left ICA origin  
2.   Less than 25% stenosis of the right ICA origin  
3.   Chronic findings as discussed above.  
 
REFERENCES:  
1.   Paula AKINS, et al. Guidelines for Management of Incidental  
Pulmonary  
Nodules Detected on CT Images: From the Fleischner Society 2017.  
Radiology.  
2017;284(1):228-243.  
2.   NASCET CRITERIA. The degree of stenosis in the cervical segment  
of the  
internal carotid artery is based on NASCET criteria. Normal is no  
stenosis.  
Mild is less than 50% stenosis. Moderate is 50-69% stenosis. Severe  
is 70% to  
99% stenosis. Total occlusion is no detectable patent lumen.  
 
Electronically signed by Wilmar Freeman MD at 09/30/2024 20:32

## 2024-10-11 ENCOUNTER — TELEPHONE (OUTPATIENT)
Dept: CARDIOLOGY | Facility: CLINIC | Age: 89
End: 2024-10-11
Payer: MEDICARE

## 2024-10-11 RX ORDER — ISOSORBIDE MONONITRATE 30 MG/1
30 TABLET, EXTENDED RELEASE ORAL DAILY
Qty: 90 TABLET | Refills: 0 | Status: SHIPPED | OUTPATIENT
Start: 2024-10-11

## 2024-10-11 RX ORDER — AMLODIPINE BESYLATE 5 MG/1
5 TABLET ORAL DAILY
Qty: 90 TABLET | Refills: 0 | Status: SHIPPED | OUTPATIENT
Start: 2024-10-11

## 2024-10-11 NOTE — TELEPHONE ENCOUNTER
Daughter called stating she spoke to staff about scheduling a follow-up appointment for the patient. Daughter reports the patient has likely not been taking medication for months.  Patient has early-onset dementia and cannot remember appointments or to take medications. Daughter has moved to [place] to help care for patient. Will send in refills for medications listed by the daughter. Scheduling will reach out to the daughter to arrange an appointment for the patient.

## 2024-10-17 ENCOUNTER — OFFICE VISIT (OUTPATIENT)
Dept: CARDIOLOGY | Facility: HOSPITAL | Age: 89
End: 2024-10-17
Payer: MEDICARE

## 2024-10-17 VITALS
WEIGHT: 173.06 LBS | HEART RATE: 65 BPM | SYSTOLIC BLOOD PRESSURE: 152 MMHG | OXYGEN SATURATION: 95 % | DIASTOLIC BLOOD PRESSURE: 68 MMHG | RESPIRATION RATE: 16 BRPM | HEIGHT: 67 IN | BODY MASS INDEX: 27.16 KG/M2

## 2024-10-17 DIAGNOSIS — I25.10 CORONARY ARTERY DISEASE INVOLVING NATIVE CORONARY ARTERY OF NATIVE HEART WITHOUT ANGINA PECTORIS: ICD-10-CM

## 2024-10-17 DIAGNOSIS — I10 BENIGN HYPERTENSION: ICD-10-CM

## 2024-10-17 DIAGNOSIS — I49.1 ATRIAL CONTRACTIONS, PREMATURE: Primary | ICD-10-CM

## 2024-10-17 PROCEDURE — 1159F MED LIST DOCD IN RCRD: CPT | Performed by: NURSE PRACTITIONER

## 2024-10-17 PROCEDURE — 1160F RVW MEDS BY RX/DR IN RCRD: CPT | Performed by: NURSE PRACTITIONER

## 2024-10-17 PROCEDURE — 99214 OFFICE O/P EST MOD 30 MIN: CPT | Performed by: NURSE PRACTITIONER

## 2024-10-17 RX ORDER — AMLODIPINE BESYLATE 5 MG/1
5 TABLET ORAL DAILY
Qty: 90 TABLET | Refills: 1 | Status: SHIPPED | OUTPATIENT
Start: 2024-10-17

## 2024-10-17 RX ORDER — HYDROCHLOROTHIAZIDE 25 MG/1
25 TABLET ORAL EVERY OTHER DAY
Qty: 90 TABLET | Refills: 1 | Status: SHIPPED | OUTPATIENT
Start: 2024-10-17

## 2024-10-17 RX ORDER — SOTALOL HYDROCHLORIDE 80 MG/1
80 TABLET ORAL DAILY
Qty: 90 TABLET | Refills: 1 | Status: SHIPPED | OUTPATIENT
Start: 2024-10-17 | End: 2024-10-21

## 2024-10-17 RX ORDER — ISOSORBIDE MONONITRATE 30 MG/1
30 TABLET, EXTENDED RELEASE ORAL DAILY
Qty: 90 TABLET | Refills: 1 | Status: SHIPPED | OUTPATIENT
Start: 2024-10-17

## 2024-10-17 NOTE — PROGRESS NOTES
Chief Complaint  Irregular Heart Beat and Establish Care (pac)    Subjective      History of Present Illness {CC  Problem List  Visit  Diagnosis   Encounters  Notes  Medications  Labs  Result Review Imaging  Media :23}     Yuni Mclain, 89 y.o. female presents to [facility] for Irregular Heart Beat and Establish Care (pac)    PROBLEM LIST:  Premature atrial contractions and premature ventricular contractions:  Recent Zio monitoring confirming PACs, no arrhythmias, no pauses.  Hypercholesterolemia.  Benign hypertension.  Chest pain:  Mild to moderate coronary artery disease by catheterization, 2003, medically managed.  Recurrent episode since November using nitroglycerin x1.  Mild mitral regurgitation by echocardiography.        Previous cardiac studies and procedures:  2003 approximately: PCI with stenting, data insufficient.     July 2019 echo  Estimated EF appears to be in the range of 66 - 70%.  Left ventricular wall thickness is consistent with hypertrophy. Sigmoid-shaped ventricular septum is present.  Left ventricular diastolic dysfunction (grade I a) consistent with impaired relaxation.     March 2021   cardiac catheterization  60% proximal LAD, otherwise mild nonobstructive atherosclerosis.  Patent mid left circumflex stent  Normal LVEDP  Normal LVEF  No aortic stenosis  No angiographically significant mitral regurgitation  48-hour Holter monitor: The predominant rhythm noted during the testing period was sinus rhythm. Premature atrial contractions occured rarely. Premature ventricular contractions did not appear during monitoring.     March 2023 Holter, 48-hour    An abnormal monitor study.    Chronotropic incompetence.    Average HR: 53. Min HR: 44. Max HR: 71.    Occasional episodes of asymptomatic nonsustained atrial tachycardia.    HPI:    Presents today feeling well overall from a cardiac standpoint. No recent cardiac appointment with Dr. Echols and requesting  "refills on cardiac medications. Reports palpitation symptoms recently well controlled. No exertional chest pain, dsypnea. SBP generally 130s on home monitoring.   Recent fall and evaluation at [facility] with lab work-will request records.       Objective     Vital Signs:   Vitals:    10/17/24 1324 10/17/24 1326   BP: 159/72 152/68   BP Location: Left arm Left arm   Patient Position: Standing Sitting   Cuff Size: Adult Adult   Pulse: 68 65   Resp:  16   SpO2: 95% 95%   Weight:  78.5 kg (173 lb 1 oz)   Height:  168.9 cm (66.5\")     Body mass index is 27.51 kg/m².  Physical Exam  Vitals and nursing note reviewed.   Constitutional:       Appearance: Normal appearance.   HENT:      Head: Normocephalic.   Eyes:      Extraocular Movements: Extraocular movements intact.   Neck:      Vascular: No carotid bruit.   Cardiovascular:      Rate and Rhythm: Normal rate and regular rhythm.      Pulses: Normal pulses.      Heart sounds: Normal heart sounds, S1 normal and S2 normal. No murmur heard.  Pulmonary:      Effort: Pulmonary effort is normal. No respiratory distress.      Breath sounds: Normal breath sounds.   Musculoskeletal:      Cervical back: Neck supple.      Right lower leg: No edema.      Left lower leg: No edema.   Skin:     General: Skin is warm and dry.   Neurological:      General: No focal deficit present.      Mental Status: She is alert.   Psychiatric:         Mood and Affect: Mood normal.         Behavior: Behavior normal.         Thought Content: Thought content normal.        Data Reviewed:{ Labs  Result Review  Imaging  Med Tab  Media :23}     Holter Monitor - 72 Hour Up To 15 Days (10/30/2023 09:47)  ECG 12 Lead Other; bilateral leg swelling (09/07/2023 16:26)  Adult Transthoracic Echo Complete W/ Cont if Necessary Per Protocol (07/10/2019 08:25)     BNP (09/07/2023 16:26)  Comprehensive Metabolic Panel (09/07/2023 16:26)  CBC & Differential (09/07/2023 16:26)  TSH (03/01/2023 " 11:31)  Magnesium (03/01/2023 11:31)    Assessment & Plan   Assessment and Plan {CC Problem List  Visit Diagnosis  ROS  Review (Popup)  Health Maintenance  Quality  BestPractice  Medications  SmartSets  SnapShot Encounters  Media :23}     1. Atrial contractions, premature  -Reports symptoms well-controlled recently with no palpitations/tachypalpitation.  Denies syncope.  -Sotalol is currently on medication list, will refill for now until recent hospital records reviewed    2. Benign hypertension  -Reports blood pressure well-controlled on ambulatory monitoring with SBP generally 130s on home monitoring.  -Continue amlodipine 5 Mg daily, HCTZ 25 mg every other day, Imdur 30 Mg daily.  Refills provided.    3. Coronary artery disease involving native coronary artery of native heart without angina pectoris  -Asymptomatic and denies anginal symptoms/chest pain/dyspnea continue current medical therapy with ASA, amlodipine, Imdur as prescribed. Refills provided.  -Will message cardiology to help coordinate follow-up      Follow Up {Instructions Charge Capture  Follow-up Communications :23}     Return if symptoms worsen or fail to improve.      Patient was given instructions and counseling regarding her condition or for health maintenance advice. Please see specific information pulled into the AVS if appropriate.  Patient was instructed to call the [facility] with any questions, concerns, or worsening symptoms.    Dictated Utilizing Dragon Dictation   Please note that portions of this note were completed with a voice recognition program.   Part of this note may be an electronic transcription/translation of spoken language to printed text using the Dragon Dictation System.

## 2024-10-21 NOTE — PROGRESS NOTES
Upon review of [facility] records sotalol was not on patient's medication list. Reviewed last cardiology progress note that recommended that she discontinue sotalol.    Contacted patient's daughter and instructed to discontinue sotalol as previously instructed at primary cardiology appointment. Daughter voiced understanding and appreciative of assistance.

## 2025-04-09 ENCOUNTER — TELEPHONE (OUTPATIENT)
Dept: CARDIOLOGY | Facility: CLINIC | Age: 89
End: 2025-04-09
Payer: MEDICARE

## 2025-07-01 ENCOUNTER — OFFICE VISIT (OUTPATIENT)
Dept: CARDIOLOGY | Facility: CLINIC | Age: 89
End: 2025-07-01
Payer: MEDICARE

## 2025-07-01 VITALS
SYSTOLIC BLOOD PRESSURE: 138 MMHG | BODY MASS INDEX: 27.47 KG/M2 | DIASTOLIC BLOOD PRESSURE: 60 MMHG | WEIGHT: 175 LBS | OXYGEN SATURATION: 97 % | HEART RATE: 60 BPM | HEIGHT: 67 IN

## 2025-07-01 DIAGNOSIS — E78.00 HYPERCHOLESTEROLEMIA: ICD-10-CM

## 2025-07-01 DIAGNOSIS — I25.10 CORONARY ARTERY DISEASE INVOLVING NATIVE CORONARY ARTERY OF NATIVE HEART WITHOUT ANGINA PECTORIS: Primary | ICD-10-CM

## 2025-07-01 DIAGNOSIS — I34.0 NONRHEUMATIC MITRAL VALVE REGURGITATION: ICD-10-CM

## 2025-07-01 RX ORDER — DONEPEZIL HYDROCHLORIDE 10 MG/1
10 TABLET, FILM COATED ORAL NIGHTLY
COMMUNITY

## 2025-07-01 RX ORDER — MEMANTINE HYDROCHLORIDE 5 MG-10 MG
KIT ORAL SEE ADMIN INSTRUCTIONS
COMMUNITY
Start: 2025-06-21 | End: 2025-07-01

## 2025-07-01 NOTE — PROGRESS NOTES
[facility]  Office visit  Yuni Mclain  1935  [phone]    VISIT DATE:  7/1/2025      PCP: Sky Gruber MD  [address]  [facility] [address]    CC:  Chief Complaint   Patient presents with    Atrial contractions, premature     2 month follow up                                   CBelll       PROBLEM LIST:  Premature atrial contractions and premature ventricular contractions:  Recent Zio monitoring confirming PACs, no arrhythmias, no pauses.  Hypercholesterolemia.  Benign hypertension.  Chest pain:  Mild to moderate coronary artery disease by catheterization, 2003, medically managed.  Recurrent episode since November using nitroglycerin x1.  Mild mitral regurgitation by echocardiography.      Previous cardiac studies and procedures:  2003 approximately: PCI with stenting, data insufficient.    July 2019 echo  Estimated EF appears to be in the range of 66 - 70%.  Left ventricular wall thickness is consistent with hypertrophy. Sigmoid-shaped ventricular septum is present.  Left ventricular diastolic dysfunction (grade I a) consistent with impaired relaxation.    March 2021   cardiac catheterization  60% proximal LAD, otherwise mild nonobstructive atherosclerosis.  Patent mid left circumflex stent  Normal LVEDP  Normal LVEF  No aortic stenosis  No angiographically significant mitral regurgitation  48-hour Holter monitor: The predominant rhythm noted during the testing period was sinus rhythm. Premature atrial contractions occured rarely. Premature ventricular contractions did not appear during monitoring.    March 2023 Holter, 48-hour    An abnormal monitor study.    Chronotropic incompetence.    Average HR: 53. Min HR: 44. Max HR: 71.    Occasional episodes of asymptomatic nonsustained atrial tachycardia.    ASSESSMENT:   Diagnosis Plan   1. Coronary artery disease involving native coronary artery of native heart without angina pectoris        2.  "Hypercholesterolemia        3. Nonrheumatic mitral valve regurgitation              PLAN:  -Premature atrial contractions: Off class Ic agents due to history of CAD.  off sotalol due to concern for chronotropic incompetence and symptomatic sinus bradycardia.    -Hypertension: Goal less than 140/90 mmHg, ideally less than 130/80 mmHg.  Currently with reasonable control.  Continue current medical therapy..  Low-dose Lasix for dependent edema.    -Dyslipidemia: Continue current medical therapy.    Coronary artery disease: Currently stable asymptomatic.  Remote PCI.  Continue current medical therapy.      Subjective  Reports that she is feeling good.  Only complaining of issues with hearing loss.  Using a cane for ambulation, intermittently a walker.  Blood pressures running less than 140/90 mmHg.  No chest pain, sustained palpitations and dyspnea.  Stable mild bilateral dependent edema which intermittently worsens.    PHYSICAL EXAMINATION:  Vitals:    07/01/25 1442   BP: 138/60   BP Location: Right arm   Patient Position: Sitting   Cuff Size: Adult   Pulse: 60   SpO2: 97%   Weight: 79.4 kg (175 lb)   Height: 168.9 cm (66.5\")       General Appearance:    Alert, cooperative, no distress, appears stated age   Head:    Normocephalic, without obvious abnormality, atraumatic   Lungs:     Clear to auscultation bilaterally, respirations unlabored   Chest Wall:    No tenderness or deformity, I do not appreciate a bruit over the right upper chest.    Heart:    Regular rate and rhythm, S1 and S2 normal, 2/6 early peaking systolic murmur right upper sternal border, rub   or gallop, normal carotid impulse bilaterally without bruit.   Abdomen:     Soft, non-tender   Extremities:   Extremities normal, atraumatic, no cyanosis.  1+ bilateral pedal and pretibial edema.   Pulses:   2+ and symmetric all extremities   Skin:   Skin color, texture, turgor normal, no rashes or lesions       Diagnostic Data:  Procedures  Lab Results "   Component Value Date    TRIG 87 03/26/2021    HDL 78 (H) 03/26/2021     Lab Results   Component Value Date    GLUCOSE 95 09/07/2023    BUN 20 09/07/2023    CREATININE 0.82 09/07/2023     09/07/2023    K 4.4 09/07/2023     09/07/2023    CO2 29.0 09/07/2023     Lab Results   Component Value Date    HGBA1C 4.8 07/05/2015     Lab Results   Component Value Date    WBC 7.14 09/07/2023    HGB 12.2 09/07/2023    HCT 38.6 09/07/2023     09/07/2023       Allergies  Allergies   Allergen Reactions    Albuterol Rash    Demerol [Meperidine] Itching    Statins Itching and Myalgia       Current Medications    Current Outpatient Medications:     amLODIPine (NORVASC) 5 MG tablet, Take 1 tablet by mouth Daily. Need f/u appt for further refills. Otherwise defer to PCP., Disp: 90 tablet, Rfl: 1    aspirin 81 MG EC tablet, Take 1 tablet by mouth Daily., Disp: 90 tablet, Rfl: 0    donepezil (ARICEPT) 10 MG tablet, Take 1 tablet by mouth Every Night., Disp: , Rfl:     escitalopram (LEXAPRO) 10 MG tablet, , Disp: , Rfl:     folic acid (FOLVITE) 400 MCG tablet, Take 1 tablet by mouth Daily., Disp: , Rfl:     furosemide (LASIX) 20 MG tablet, TAKE 1 TABLET BY MOUTH ONCE DAILY AS NEEDED FOR EDEMA, Disp: 30 tablet, Rfl: 1    hydroCHLOROthiazide 25 MG tablet, Take 1 tablet by mouth Every Other Day. Need f/u appt for further refills. Otherwise defer to PCP., Disp: 90 tablet, Rfl: 1    isosorbide mononitrate (IMDUR) 30 MG 24 hr tablet, Take 1 tablet by mouth Daily. Need f/u appt for further refills. Otherwise defer to PCP., Disp: 90 tablet, Rfl: 1    niacin 500 MG tablet, Take 1 tablet by mouth Every Night. (Patient taking differently: Take 1 tablet by mouth Daily.), Disp: 90 tablet, Rfl: 3    potassium chloride 10 MEQ CR tablet, TAKE 1 TABLET BY MOUTH AS NEEDED (TAKE WITH LASIX), Disp: 30 tablet, Rfl: 1    ezetimibe (ZETIA) 10 MG tablet, Take 1 tablet by mouth once daily (Patient not taking: Reported on 7/1/2025), Disp: 90  tablet, Rfl: 0    melatonin 5 MG tablet tablet, Take 1 tablet by mouth At Night As Needed. Melatonin gummy product (Patient not taking: Reported on 10/17/2024), Disp: , Rfl:           ROS  Review of Systems   Cardiovascular:  Positive for irregular heartbeat and palpitations. Negative for chest pain and dyspnea on exertion.   Respiratory:  Positive for snoring. Negative for cough.    Genitourinary:  Positive for frequency.       SOCIAL HX  Social History     Socioeconomic History    Marital status:    Tobacco Use    Smoking status: Former     Current packs/day: 0.00     Average packs/day: 1 pack/day for 10.0 years (10.0 ttl pk-yrs)     Types: Cigarettes     Start date: 1959     Quit date: 1969     Years since quittin.5    Smokeless tobacco: Never   Vaping Use    Vaping status: Never Used   Substance and Sexual Activity    Alcohol use: Yes     Comment: occasionally    Drug use: No    Sexual activity: Defer       FAMILY HX  Family History   Problem Relation Age of Onset    Heart disease Mother     Cancer Father     Arthritis Father              Lewis Echols III, MD, FACC

## (undated) DEVICE — MODEL BT2000 P/N 700287-012KIT CONTENTS: MANIFOLD WITH SALINE AND CONTRAST PORTS, SALINE TUBING WITH SPIKE AND HAND SYRINGE, TRANSDUCER: Brand: BT2000 AUTOMATED MANIFOLD KIT

## (undated) DEVICE — DEV COMP RAD PRELUDESYNC 24CM

## (undated) DEVICE — CATH DIAG EXPO M/ PK 5F FL4/FR4 PIG

## (undated) DEVICE — SKIN PREP TRAY W/CHG: Brand: MEDLINE INDUSTRIES, INC.

## (undated) DEVICE — GW PERIPH GUIDERIGHT STD/EXCHNG/J/TIP SS 0.035IN 5X260CM

## (undated) DEVICE — MODEL AT P65, P/N 701554-001KIT CONTENTS: HAND CONTROLLER, 3-WAY HIGH-PRESSURE STOPCOCK WITH ROTATING END AND PREMIUM HIGH-PRESSURE TUBING: Brand: ANGIOTOUCH® KIT

## (undated) DEVICE — CATH DIAG EXPO .045 FL3  5F 100CM

## (undated) DEVICE — PK CATH CARD 10

## (undated) DEVICE — INTRO SHEATH PRELUDE EASE HC .025 6F 11CM